# Patient Record
Sex: MALE | Race: BLACK OR AFRICAN AMERICAN | Employment: UNEMPLOYED | URBAN - METROPOLITAN AREA
[De-identification: names, ages, dates, MRNs, and addresses within clinical notes are randomized per-mention and may not be internally consistent; named-entity substitution may affect disease eponyms.]

---

## 2018-01-01 ENCOUNTER — HOSPITAL ENCOUNTER (OUTPATIENT)
Age: 68
Discharge: HOME | End: 2018-06-15
Payer: MEDICARE

## 2018-01-01 ENCOUNTER — HOSPITAL ENCOUNTER (OUTPATIENT)
Age: 68
Discharge: HOME | End: 2018-09-06
Payer: MEDICARE

## 2018-01-01 DIAGNOSIS — E78.5 HYPERLIPIDEMIA, UNSPECIFIED HYPERLIPIDEMIA TYPE: ICD-10-CM

## 2018-01-01 LAB
ANION GAP SERPL CALCULATED.3IONS-SCNC: 12 MMOL/L (ref 7–16)
BUN BLDV-MCNC: 15 MG/DL (ref 8–23)
CALCIUM SERPL-MCNC: 9.1 MG/DL (ref 8.6–10.2)
CHLORIDE BLD-SCNC: 103 MMOL/L (ref 98–107)
CHOLESTEROL, TOTAL: 141 MG/DL (ref 0–199)
CO2: 28 MMOL/L (ref 22–29)
CREAT SERPL-MCNC: 1.2 MG/DL (ref 0.7–1.2)
GFR AFRICAN AMERICAN: >60
GFR NON-AFRICAN AMERICAN: >60 ML/MIN/1.73
GLUCOSE BLD-MCNC: 94 MG/DL (ref 74–109)
HCT VFR BLD CALC: 43 % (ref 37–54)
HDLC SERPL-MCNC: 65 MG/DL
HEMOGLOBIN: 13.8 G/DL (ref 12.5–16.5)
LDL CHOLESTEROL CALCULATED: 62 MG/DL (ref 0–99)
MCH RBC QN AUTO: 28.9 PG (ref 26–35)
MCHC RBC AUTO-ENTMCNC: 32.1 % (ref 32–34.5)
MCV RBC AUTO: 90.1 FL (ref 80–99.9)
PDW BLD-RTO: 13.7 FL (ref 11.5–15)
PLATELET # BLD: 205 E9/L (ref 130–450)
PMV BLD AUTO: 10.2 FL (ref 7–12)
POTASSIUM SERPL-SCNC: 4.1 MMOL/L (ref 3.5–5)
PROSTATE SPECIFIC ANTIGEN: 0.58 NG/ML (ref 0–4)
RBC # BLD: 4.77 E12/L (ref 3.8–5.8)
SODIUM BLD-SCNC: 143 MMOL/L (ref 132–146)
TRIGL SERPL-MCNC: 72 MG/DL (ref 0–149)
VLDLC SERPL CALC-MCNC: 14 MG/DL
WBC # BLD: 3.3 E9/L (ref 4.5–11.5)

## 2018-01-01 PROCEDURE — 36415 COLL VENOUS BLD VENIPUNCTURE: CPT

## 2018-01-01 PROCEDURE — G0103 PSA SCREENING: HCPCS

## 2018-01-01 PROCEDURE — 85027 COMPLETE CBC AUTOMATED: CPT

## 2018-01-01 PROCEDURE — 80048 BASIC METABOLIC PNL TOTAL CA: CPT

## 2018-01-01 PROCEDURE — 80061 LIPID PANEL: CPT

## 2019-01-01 ENCOUNTER — HOSPITAL ENCOUNTER (INPATIENT)
Age: 69
LOS: 3 days | Discharge: OTHER INSTITUTION | DRG: 309 | End: 2019-04-24
Attending: EMERGENCY MEDICINE | Admitting: INTERNAL MEDICINE
Payer: MEDICARE

## 2019-01-01 ENCOUNTER — OFFICE VISIT (OUTPATIENT)
Dept: PAIN MANAGEMENT | Age: 69
End: 2019-01-01
Payer: MEDICARE

## 2019-01-01 ENCOUNTER — OFFICE VISIT (OUTPATIENT)
Dept: ONCOLOGY | Age: 69
End: 2019-01-01
Payer: MEDICARE

## 2019-01-01 ENCOUNTER — HOSPITAL ENCOUNTER (OUTPATIENT)
Age: 69
Discharge: HOME | End: 2019-04-04
Payer: MEDICARE

## 2019-01-01 ENCOUNTER — TELEPHONE (OUTPATIENT)
Dept: ONCOLOGY | Age: 69
End: 2019-01-01

## 2019-01-01 ENCOUNTER — HOSPITAL ENCOUNTER (OUTPATIENT)
Dept: INFUSION THERAPY | Age: 69
End: 2019-01-01

## 2019-01-01 ENCOUNTER — APPOINTMENT (OUTPATIENT)
Dept: CT IMAGING | Age: 69
DRG: 552 | End: 2019-01-01
Payer: MEDICARE

## 2019-01-01 ENCOUNTER — APPOINTMENT (OUTPATIENT)
Dept: NUCLEAR MEDICINE | Age: 69
DRG: 309 | End: 2019-01-01
Payer: MEDICARE

## 2019-01-01 ENCOUNTER — HOSPITAL ENCOUNTER (OUTPATIENT)
Dept: GENERAL RADIOLOGY | Age: 69
Discharge: HOME | End: 2019-05-09
Payer: MEDICARE

## 2019-01-01 ENCOUNTER — PREP FOR PROCEDURE (OUTPATIENT)
Dept: PAIN MANAGEMENT | Age: 69
End: 2019-01-01

## 2019-01-01 ENCOUNTER — HOSPITAL ENCOUNTER (INPATIENT)
Age: 69
LOS: 3 days | Discharge: HOME HEALTH | DRG: 552 | End: 2019-03-24
Attending: EMERGENCY MEDICINE | Admitting: FAMILY MEDICINE
Payer: MEDICARE

## 2019-01-01 ENCOUNTER — ANESTHESIA (OUTPATIENT)
Dept: CARDIAC CATH/INVASIVE PROCEDURES | Age: 69
End: 2019-01-01

## 2019-01-01 ENCOUNTER — TELEPHONE (OUTPATIENT)
Dept: CARDIOLOGY CLINIC | Age: 69
End: 2019-01-01

## 2019-01-01 ENCOUNTER — HOSPITAL ENCOUNTER (EMERGENCY)
Age: 69
Discharge: HOME | End: 2019-02-27
Attending: EMERGENCY MEDICINE
Payer: MEDICARE

## 2019-01-01 ENCOUNTER — HOSPITAL ENCOUNTER (OUTPATIENT)
Dept: INFUSION THERAPY | Age: 69
Discharge: HOME | End: 2019-04-01
Payer: MEDICARE

## 2019-01-01 ENCOUNTER — APPOINTMENT (OUTPATIENT)
Dept: GENERAL RADIOLOGY | Age: 69
DRG: 309 | End: 2019-01-01
Payer: MEDICARE

## 2019-01-01 ENCOUNTER — ANESTHESIA EVENT (OUTPATIENT)
Dept: CARDIAC CATH/INVASIVE PROCEDURES | Age: 69
End: 2019-01-01

## 2019-01-01 ENCOUNTER — APPOINTMENT (OUTPATIENT)
Dept: GENERAL RADIOLOGY | Age: 69
End: 2019-01-01
Payer: MEDICARE

## 2019-01-01 ENCOUNTER — HOSPITAL ENCOUNTER (OUTPATIENT)
Age: 69
Discharge: HOME | End: 2019-06-10
Payer: MEDICARE

## 2019-01-01 ENCOUNTER — HOSPITAL ENCOUNTER (OUTPATIENT)
Dept: NON INVASIVE DIAGNOSTICS | Age: 69
Discharge: HOME | End: 2019-03-14
Payer: MEDICARE

## 2019-01-01 ENCOUNTER — HOSPITAL ENCOUNTER (OUTPATIENT)
Dept: CARDIAC CATH/INVASIVE PROCEDURES | Age: 69
Setting detail: OUTPATIENT SURGERY
Discharge: HOME | End: 2019-03-28
Payer: MEDICARE

## 2019-01-01 ENCOUNTER — APPOINTMENT (OUTPATIENT)
Dept: MRI IMAGING | Age: 69
DRG: 552 | End: 2019-01-01
Payer: MEDICARE

## 2019-01-01 ENCOUNTER — OFFICE VISIT (OUTPATIENT)
Dept: FAMILY MEDICINE CLINIC | Age: 69
End: 2019-01-01
Payer: MEDICARE

## 2019-01-01 VITALS
TEMPERATURE: 99.2 F | RESPIRATION RATE: 16 BRPM | HEIGHT: 69 IN | SYSTOLIC BLOOD PRESSURE: 160 MMHG | WEIGHT: 182 LBS | BODY MASS INDEX: 26.96 KG/M2 | HEART RATE: 107 BPM | OXYGEN SATURATION: 97 % | DIASTOLIC BLOOD PRESSURE: 62 MMHG

## 2019-01-01 VITALS
TEMPERATURE: 96.4 F | HEART RATE: 115 BPM | DIASTOLIC BLOOD PRESSURE: 65 MMHG | SYSTOLIC BLOOD PRESSURE: 148 MMHG | HEIGHT: 68 IN | RESPIRATION RATE: 18 BRPM | BODY MASS INDEX: 24.48 KG/M2

## 2019-01-01 VITALS
HEART RATE: 84 BPM | DIASTOLIC BLOOD PRESSURE: 61 MMHG | OXYGEN SATURATION: 97 % | TEMPERATURE: 98 F | SYSTOLIC BLOOD PRESSURE: 154 MMHG | RESPIRATION RATE: 14 BRPM

## 2019-01-01 VITALS
SYSTOLIC BLOOD PRESSURE: 145 MMHG | WEIGHT: 180 LBS | DIASTOLIC BLOOD PRESSURE: 63 MMHG | BODY MASS INDEX: 27.28 KG/M2 | HEIGHT: 68 IN | HEART RATE: 97 BPM | RESPIRATION RATE: 24 BRPM | TEMPERATURE: 98.5 F

## 2019-01-01 VITALS
OXYGEN SATURATION: 93 % | RESPIRATION RATE: 18 BRPM | HEART RATE: 86 BPM | HEIGHT: 68 IN | SYSTOLIC BLOOD PRESSURE: 155 MMHG | BODY MASS INDEX: 24.4 KG/M2 | TEMPERATURE: 97.9 F | WEIGHT: 161 LBS | DIASTOLIC BLOOD PRESSURE: 70 MMHG

## 2019-01-01 VITALS
TEMPERATURE: 98.7 F | DIASTOLIC BLOOD PRESSURE: 62 MMHG | HEART RATE: 101 BPM | RESPIRATION RATE: 20 BRPM | OXYGEN SATURATION: 97 % | SYSTOLIC BLOOD PRESSURE: 172 MMHG

## 2019-01-01 VITALS
HEART RATE: 77 BPM | SYSTOLIC BLOOD PRESSURE: 140 MMHG | RESPIRATION RATE: 16 BRPM | HEIGHT: 69 IN | DIASTOLIC BLOOD PRESSURE: 58 MMHG | WEIGHT: 153.2 LBS | OXYGEN SATURATION: 96 % | BODY MASS INDEX: 22.69 KG/M2 | TEMPERATURE: 98.4 F

## 2019-01-01 VITALS
SYSTOLIC BLOOD PRESSURE: 117 MMHG | OXYGEN SATURATION: 100 % | RESPIRATION RATE: 17 BRPM | DIASTOLIC BLOOD PRESSURE: 45 MMHG

## 2019-01-01 VITALS
RESPIRATION RATE: 18 BRPM | DIASTOLIC BLOOD PRESSURE: 72 MMHG | OXYGEN SATURATION: 93 % | TEMPERATURE: 98 F | HEART RATE: 84 BPM | SYSTOLIC BLOOD PRESSURE: 138 MMHG | HEIGHT: 68 IN | BODY MASS INDEX: 24.4 KG/M2 | WEIGHT: 161 LBS

## 2019-01-01 DIAGNOSIS — I35.1 NONRHEUMATIC AORTIC VALVE INSUFFICIENCY: ICD-10-CM

## 2019-01-01 DIAGNOSIS — M54.9 INTRACTABLE BACK PAIN: ICD-10-CM

## 2019-01-01 DIAGNOSIS — E87.1 HYPONATREMIA: ICD-10-CM

## 2019-01-01 DIAGNOSIS — I50.9 CONGESTIVE HEART FAILURE, UNSPECIFIED HF CHRONICITY, UNSPECIFIED HEART FAILURE TYPE (HCC): ICD-10-CM

## 2019-01-01 DIAGNOSIS — D64.9 ANEMIA, UNSPECIFIED TYPE: Primary | ICD-10-CM

## 2019-01-01 DIAGNOSIS — M54.50 CHRONIC BILATERAL LOW BACK PAIN WITHOUT SCIATICA: ICD-10-CM

## 2019-01-01 DIAGNOSIS — M53.3 DISORDER OF SACRUM: ICD-10-CM

## 2019-01-01 DIAGNOSIS — M53.3 DISORDER OF SACRUM: Primary | ICD-10-CM

## 2019-01-01 DIAGNOSIS — R77.8 ELEVATED TROPONIN: ICD-10-CM

## 2019-01-01 DIAGNOSIS — Z53.1 REFUSAL OF BLOOD TRANSFUSIONS AS PATIENT IS JEHOVAH'S WITNESS: ICD-10-CM

## 2019-01-01 DIAGNOSIS — Z01.810 PREOPERATIVE CARDIOVASCULAR EXAMINATION: Primary | ICD-10-CM

## 2019-01-01 DIAGNOSIS — G89.29 CHRONIC BILATERAL LOW BACK PAIN WITHOUT SCIATICA: ICD-10-CM

## 2019-01-01 DIAGNOSIS — R00.0 TACHYCARDIA: Primary | ICD-10-CM

## 2019-01-01 DIAGNOSIS — R06.02 SHORTNESS OF BREATH: Primary | ICD-10-CM

## 2019-01-01 DIAGNOSIS — M54.50 LUMBAR BACK PAIN: Primary | ICD-10-CM

## 2019-01-01 DIAGNOSIS — D64.9 NORMOCYTIC ANEMIA: ICD-10-CM

## 2019-01-01 DIAGNOSIS — R06.02 SOB (SHORTNESS OF BREATH): Primary | ICD-10-CM

## 2019-01-01 DIAGNOSIS — R52 ACUTE PAIN: Primary | ICD-10-CM

## 2019-01-01 DIAGNOSIS — I35.1 AORTIC VALVE INSUFFICIENCY, ETIOLOGY OF CARDIAC VALVE DISEASE UNSPECIFIED: ICD-10-CM

## 2019-01-01 DIAGNOSIS — D64.9 NORMOCYTIC ANEMIA: Primary | ICD-10-CM

## 2019-01-01 DIAGNOSIS — M51.26 LUMBAR HERNIATED DISC: ICD-10-CM

## 2019-01-01 DIAGNOSIS — R52 PAIN: ICD-10-CM

## 2019-01-01 DIAGNOSIS — D64.9 CHRONIC ANEMIA: ICD-10-CM

## 2019-01-01 LAB
ABO/RH: NORMAL
ALBUMIN SERPL-MCNC: 2.6 G/DL (ref 3.5–4.7)
ALBUMIN SERPL-MCNC: 2.8 G/DL (ref 3.5–5.2)
ALBUMIN SERPL-MCNC: 3.1 G/DL (ref 3.5–5.2)
ALBUMIN SERPL-MCNC: 3.2 G/DL (ref 3.5–5.2)
ALP BLD-CCNC: 118 U/L (ref 40–129)
ALP BLD-CCNC: 132 U/L (ref 40–129)
ALP BLD-CCNC: 85 U/L (ref 40–129)
ALPHA-1-GLOBULIN: 0.5 G/DL (ref 0.2–0.4)
ALPHA-2-GLOBULIN: 0.9 G/FL (ref 0.5–1)
ALT SERPL-CCNC: 40 U/L (ref 0–40)
ALT SERPL-CCNC: 75 U/L (ref 0–40)
ALT SERPL-CCNC: 78 U/L (ref 0–40)
ANION GAP SERPL CALCULATED.3IONS-SCNC: 10 MMOL/L (ref 7–16)
ANION GAP SERPL CALCULATED.3IONS-SCNC: 10 MMOL/L (ref 7–16)
ANION GAP SERPL CALCULATED.3IONS-SCNC: 12 MMOL/L (ref 7–16)
ANION GAP SERPL CALCULATED.3IONS-SCNC: 14 MMOL/L (ref 7–16)
ANION GAP SERPL CALCULATED.3IONS-SCNC: 15 MMOL/L (ref 7–16)
ANTIBODY SCREEN: NORMAL
AST SERPL-CCNC: 32 U/L (ref 0–39)
AST SERPL-CCNC: 40 U/L (ref 0–39)
AST SERPL-CCNC: 45 U/L (ref 0–39)
BACTERIA: ABNORMAL /HPF
BASOPHILS ABSOLUTE: 0 E9/L (ref 0–0.2)
BASOPHILS ABSOLUTE: 0 E9/L (ref 0–0.2)
BASOPHILS ABSOLUTE: 0.01 E9/L (ref 0–0.2)
BASOPHILS ABSOLUTE: 0.02 E9/L (ref 0–0.2)
BASOPHILS ABSOLUTE: 0.02 E9/L (ref 0–0.2)
BASOPHILS ABSOLUTE: 0.03 E9/L (ref 0–0.2)
BASOPHILS ABSOLUTE: 0.03 E9/L (ref 0–0.2)
BASOPHILS ABSOLUTE: 0.04 E9/L (ref 0–0.2)
BASOPHILS RELATIVE PERCENT: 0.1 % (ref 0–2)
BASOPHILS RELATIVE PERCENT: 0.2 % (ref 0–2)
BASOPHILS RELATIVE PERCENT: 0.3 % (ref 0–2)
BASOPHILS RELATIVE PERCENT: 0.9 % (ref 0–2)
BETA GLOBULIN: 1.1 G/DL (ref 0.8–1.3)
BETA-2 MICROGLOBULIN: 2.3 MG/L (ref 0.6–2.4)
BILIRUB SERPL-MCNC: 0.7 MG/DL (ref 0–1.2)
BILIRUB SERPL-MCNC: 0.7 MG/DL (ref 0–1.2)
BILIRUB SERPL-MCNC: 0.8 MG/DL (ref 0–1.2)
BILIRUBIN URINE: NEGATIVE
BILIRUBIN URINE: NEGATIVE
BLOOD CULTURE, ROUTINE: NORMAL
BLOOD, URINE: NEGATIVE
BLOOD, URINE: NEGATIVE
BUN BLDV-MCNC: 13 MG/DL (ref 8–23)
BUN BLDV-MCNC: 14 MG/DL (ref 8–23)
BUN BLDV-MCNC: 14 MG/DL (ref 8–23)
BUN BLDV-MCNC: 15 MG/DL (ref 8–23)
BUN BLDV-MCNC: 15 MG/DL (ref 8–23)
BUN BLDV-MCNC: 19 MG/DL (ref 8–23)
BUN BLDV-MCNC: 24 MG/DL (ref 8–23)
BURR CELLS: ABNORMAL
BURR CELLS: ABNORMAL
CALCIUM SERPL-MCNC: 8.4 MG/DL (ref 8.6–10.2)
CALCIUM SERPL-MCNC: 8.5 MG/DL (ref 8.6–10.2)
CALCIUM SERPL-MCNC: 8.7 MG/DL (ref 8.6–10.2)
CALCIUM SERPL-MCNC: 8.7 MG/DL (ref 8.6–10.2)
CALCIUM SERPL-MCNC: 8.8 MG/DL (ref 8.6–10.2)
CALCIUM SERPL-MCNC: 8.9 MG/DL (ref 8.6–10.2)
CALCIUM SERPL-MCNC: 9.1 MG/DL (ref 8.6–10.2)
CHLORIDE BLD-SCNC: 100 MMOL/L (ref 98–107)
CHLORIDE BLD-SCNC: 101 MMOL/L (ref 98–107)
CHLORIDE BLD-SCNC: 95 MMOL/L (ref 98–107)
CHLORIDE BLD-SCNC: 95 MMOL/L (ref 98–107)
CHLORIDE BLD-SCNC: 96 MMOL/L (ref 98–107)
CHLORIDE BLD-SCNC: 97 MMOL/L (ref 98–107)
CHLORIDE BLD-SCNC: 98 MMOL/L (ref 98–107)
CLARITY: CLEAR
CLARITY: CLEAR
CO2: 22 MMOL/L (ref 22–29)
CO2: 23 MMOL/L (ref 22–29)
CO2: 25 MMOL/L (ref 22–29)
CO2: 25 MMOL/L (ref 22–29)
CO2: 26 MMOL/L (ref 22–29)
CO2: 26 MMOL/L (ref 22–29)
CO2: 27 MMOL/L (ref 22–29)
COLOR: YELLOW
COLOR: YELLOW
COPPER: 209 UG/DL (ref 70–140)
CREAT SERPL-MCNC: 0.7 MG/DL (ref 0.7–1.2)
CREAT SERPL-MCNC: 0.8 MG/DL (ref 0.7–1.2)
CREAT SERPL-MCNC: 0.8 MG/DL (ref 0.7–1.2)
CREAT SERPL-MCNC: 0.9 MG/DL (ref 0.7–1.2)
CREAT SERPL-MCNC: 1.1 MG/DL (ref 0.7–1.2)
CRYSTALS, UA: ABNORMAL
CULTURE, BLOOD 2: ABNORMAL
CULTURE, BLOOD 2: ABNORMAL
DAT POLYSPECIFIC: NORMAL
EKG ATRIAL RATE: 123 BPM
EKG ATRIAL RATE: 93 BPM
EKG P AXIS: 89 DEGREES
EKG P-R INTERVAL: 144 MS
EKG P-R INTERVAL: 160 MS
EKG Q-T INTERVAL: 336 MS
EKG Q-T INTERVAL: 402 MS
EKG QRS DURATION: 86 MS
EKG QRS DURATION: 98 MS
EKG QTC CALCULATION (BAZETT): 481 MS
EKG QTC CALCULATION (BAZETT): 499 MS
EKG R AXIS: 138 DEGREES
EKG R AXIS: 83 DEGREES
EKG T AXIS: 166 DEGREES
EKG T AXIS: 33 DEGREES
EKG VENTRICULAR RATE: 123 BPM
EKG VENTRICULAR RATE: 93 BPM
ELECTROPHORESIS: ABNORMAL
EOSINOPHILS ABSOLUTE: 0 E9/L (ref 0.05–0.5)
EOSINOPHILS ABSOLUTE: 0.01 E9/L (ref 0.05–0.5)
EOSINOPHILS ABSOLUTE: 0.02 E9/L (ref 0.05–0.5)
EOSINOPHILS ABSOLUTE: 0.24 E9/L (ref 0.05–0.5)
EOSINOPHILS RELATIVE PERCENT: 0 % (ref 0–6)
EOSINOPHILS RELATIVE PERCENT: 0.1 % (ref 0–6)
EOSINOPHILS RELATIVE PERCENT: 0.2 % (ref 0–6)
EOSINOPHILS RELATIVE PERCENT: 5.4 % (ref 0–6)
FERRITIN: 842 NG/ML
FOLATE: 15 NG/ML (ref 4.8–24.2)
GAMMA GLOBULIN: 1.7 G/DL (ref 0.7–1.6)
GFR AFRICAN AMERICAN: >60
GFR NON-AFRICAN AMERICAN: >60 ML/MIN/1.73
GLUCOSE BLD-MCNC: 116 MG/DL (ref 74–99)
GLUCOSE BLD-MCNC: 136 MG/DL (ref 74–99)
GLUCOSE BLD-MCNC: 158 MG/DL (ref 74–99)
GLUCOSE BLD-MCNC: 161 MG/DL (ref 74–99)
GLUCOSE BLD-MCNC: 175 MG/DL (ref 74–99)
GLUCOSE BLD-MCNC: 176 MG/DL (ref 74–99)
GLUCOSE BLD-MCNC: 179 MG/DL (ref 74–99)
GLUCOSE URINE: NEGATIVE MG/DL
GLUCOSE URINE: NEGATIVE MG/DL
HAPTOGLOBIN: 278 MG/DL (ref 30–200)
HBA1C MFR BLD: 6.6 % (ref 4–5.6)
HCT VFR BLD CALC: 25 % (ref 37–54)
HCT VFR BLD CALC: 25.6 % (ref 37–54)
HCT VFR BLD CALC: 26 % (ref 37–54)
HCT VFR BLD CALC: 28.5 % (ref 37–54)
HCT VFR BLD CALC: 30.9 % (ref 37–54)
HCT VFR BLD CALC: 31 % (ref 37–54)
HCT VFR BLD CALC: 31.3 % (ref 37–54)
HCT VFR BLD CALC: 31.7 % (ref 37–54)
HCT VFR BLD CALC: 32 % (ref 37–54)
HCT VFR BLD CALC: 32.5 % (ref 37–54)
HCT VFR BLD CALC: 37.5 % (ref 37–54)
HEMOGLOBIN: 10 G/DL (ref 12.5–16.5)
HEMOGLOBIN: 10 G/DL (ref 12.5–16.5)
HEMOGLOBIN: 10.1 G/DL (ref 12.5–16.5)
HEMOGLOBIN: 10.2 G/DL (ref 12.5–16.5)
HEMOGLOBIN: 10.6 G/DL (ref 12.5–16.5)
HEMOGLOBIN: 10.6 G/DL (ref 12.5–16.5)
HEMOGLOBIN: 11.3 G/DL (ref 12.5–16.5)
HEMOGLOBIN: 7.9 G/DL (ref 12.5–16.5)
HEMOGLOBIN: 8.2 G/DL (ref 12.5–16.5)
HEMOGLOBIN: 9 G/DL (ref 12.5–16.5)
IGA: 280 MG/DL (ref 70–400)
IGG: 1273 MG/DL (ref 700–1600)
IGM: 248 MG/DL (ref 40–230)
IMMATURE GRANULOCYTES #: 0.01 E9/L
IMMATURE GRANULOCYTES #: 0.04 E9/L
IMMATURE GRANULOCYTES #: 0.07 E9/L
IMMATURE GRANULOCYTES #: 0.11 E9/L
IMMATURE GRANULOCYTES #: 0.12 E9/L
IMMATURE GRANULOCYTES #: 0.13 E9/L
IMMATURE GRANULOCYTES %: 0.2 % (ref 0–5)
IMMATURE GRANULOCYTES %: 0.4 % (ref 0–5)
IMMATURE GRANULOCYTES %: 0.6 % (ref 0–5)
IMMATURE GRANULOCYTES %: 0.6 % (ref 0–5)
IMMATURE GRANULOCYTES %: 0.8 % (ref 0–5)
IMMATURE GRANULOCYTES %: 0.9 % (ref 0–5)
IMMATURE RETIC FRACT: 14.5 % (ref 2.3–13.4)
IMMATURE RETIC FRACT: 27 % (ref 2.3–13.4)
IMMUNOFIXATION RESULT, SERUM: NORMAL
INFLUENZA A BY PCR: NOT DETECTED
INFLUENZA B BY PCR: NOT DETECTED
INR BLD: 1.4
IRON SATURATION: 8 % (ref 20–55)
IRON: 14 MCG/DL (ref 59–158)
JAK2 GENE MUTATION QUAL: NOT DETECTED
KAPPA FREE LIGHT CHAINS QNT: 2.91 MG/DL (ref 0.33–1.94)
KAPPA/LAMBDA FREE LIGHT CHAIN RATIO: 1.05 (ref 0.26–1.65)
KETONES, URINE: ABNORMAL MG/DL
KETONES, URINE: NEGATIVE MG/DL
LACTATE DEHYDROGENASE: 295 U/L (ref 135–225)
LACTIC ACID, SEPSIS: 0.9 MMOL/L (ref 0.5–1.9)
LACTIC ACID, SEPSIS: 1.3 MMOL/L (ref 0.5–1.9)
LACTIC ACID, SEPSIS: 2.1 MMOL/L (ref 0.5–1.9)
LAMBDA FREE LIGHT CHAINS QNT: 2.76 MG/DL (ref 0.57–2.63)
LEUKOCYTE ESTERASE, URINE: ABNORMAL
LEUKOCYTE ESTERASE, URINE: NEGATIVE
LV EF: 60 %
LV EF: 70 %
LVEF MODALITY: NORMAL
LVEF MODALITY: NORMAL
LYMPHOCYTES ABSOLUTE: 0.43 E9/L (ref 1.5–4)
LYMPHOCYTES ABSOLUTE: 0.6 E9/L (ref 1.5–4)
LYMPHOCYTES ABSOLUTE: 0.69 E9/L (ref 1.5–4)
LYMPHOCYTES ABSOLUTE: 0.84 E9/L (ref 1.5–4)
LYMPHOCYTES ABSOLUTE: 0.95 E9/L (ref 1.5–4)
LYMPHOCYTES ABSOLUTE: 0.95 E9/L (ref 1.5–4)
LYMPHOCYTES ABSOLUTE: 0.97 E9/L (ref 1.5–4)
LYMPHOCYTES ABSOLUTE: 1.41 E9/L (ref 1.5–4)
LYMPHOCYTES RELATIVE PERCENT: 12.5 % (ref 20–42)
LYMPHOCYTES RELATIVE PERCENT: 2.6 % (ref 20–42)
LYMPHOCYTES RELATIVE PERCENT: 21.7 % (ref 20–42)
LYMPHOCYTES RELATIVE PERCENT: 3.5 % (ref 20–42)
LYMPHOCYTES RELATIVE PERCENT: 4.7 % (ref 20–42)
LYMPHOCYTES RELATIVE PERCENT: 5 % (ref 20–42)
LYMPHOCYTES RELATIVE PERCENT: 6 % (ref 20–42)
LYMPHOCYTES RELATIVE PERCENT: 7.8 % (ref 20–42)
Lab: NORMAL
MAGNESIUM: 2 MG/DL (ref 1.6–2.6)
MCH RBC QN AUTO: 26.3 PG (ref 26–35)
MCH RBC QN AUTO: 26.5 PG (ref 26–35)
MCH RBC QN AUTO: 26.5 PG (ref 26–35)
MCH RBC QN AUTO: 27.6 PG (ref 26–35)
MCH RBC QN AUTO: 27.7 PG (ref 26–35)
MCH RBC QN AUTO: 27.8 PG (ref 26–35)
MCH RBC QN AUTO: 29 PG (ref 26–35)
MCHC RBC AUTO-ENTMCNC: 30.1 % (ref 32–34.5)
MCHC RBC AUTO-ENTMCNC: 31.5 % (ref 32–34.5)
MCHC RBC AUTO-ENTMCNC: 31.6 % (ref 32–34.5)
MCHC RBC AUTO-ENTMCNC: 32.2 % (ref 32–34.5)
MCHC RBC AUTO-ENTMCNC: 32.3 % (ref 32–34.5)
MCHC RBC AUTO-ENTMCNC: 32.3 % (ref 32–34.5)
MCHC RBC AUTO-ENTMCNC: 32.4 % (ref 32–34.5)
MCHC RBC AUTO-ENTMCNC: 32.6 % (ref 32–34.5)
MCHC RBC AUTO-ENTMCNC: 33.1 % (ref 32–34.5)
MCV RBC AUTO: 83.9 FL (ref 80–99.9)
MCV RBC AUTO: 84.1 FL (ref 80–99.9)
MCV RBC AUTO: 85.1 FL (ref 80–99.9)
MCV RBC AUTO: 85.7 FL (ref 80–99.9)
MCV RBC AUTO: 85.8 FL (ref 80–99.9)
MCV RBC AUTO: 85.8 FL (ref 80–99.9)
MCV RBC AUTO: 85.9 FL (ref 80–99.9)
MCV RBC AUTO: 87.4 FL (ref 80–99.9)
MCV RBC AUTO: 87.4 FL (ref 80–99.9)
METAMYELOCYTES RELATIVE PERCENT: 0.9 % (ref 0–1)
MONOCYTES ABSOLUTE: 0.14 E9/L (ref 0.1–0.95)
MONOCYTES ABSOLUTE: 0.65 E9/L (ref 0.1–0.95)
MONOCYTES ABSOLUTE: 0.71 E9/L (ref 0.1–0.95)
MONOCYTES ABSOLUTE: 0.76 E9/L (ref 0.1–0.95)
MONOCYTES ABSOLUTE: 0.89 E9/L (ref 0.1–0.95)
MONOCYTES ABSOLUTE: 0.98 E9/L (ref 0.1–0.95)
MONOCYTES ABSOLUTE: 1.04 E9/L (ref 0.1–0.95)
MONOCYTES ABSOLUTE: 1.26 E9/L (ref 0.1–0.95)
MONOCYTES RELATIVE PERCENT: 0.9 % (ref 2–12)
MONOCYTES RELATIVE PERCENT: 11.1 % (ref 2–12)
MONOCYTES RELATIVE PERCENT: 14.5 % (ref 2–12)
MONOCYTES RELATIVE PERCENT: 4.7 % (ref 2–12)
MONOCYTES RELATIVE PERCENT: 4.9 % (ref 2–12)
MONOCYTES RELATIVE PERCENT: 5.2 % (ref 2–12)
MONOCYTES RELATIVE PERCENT: 7 % (ref 2–12)
MONOCYTES RELATIVE PERCENT: 8.5 % (ref 2–12)
NEUTROPHILS ABSOLUTE: 10.13 E9/L (ref 1.8–7.3)
NEUTROPHILS ABSOLUTE: 12.08 E9/L (ref 1.8–7.3)
NEUTROPHILS ABSOLUTE: 13.04 E9/L (ref 1.8–7.3)
NEUTROPHILS ABSOLUTE: 13.74 E9/L (ref 1.8–7.3)
NEUTROPHILS ABSOLUTE: 13.77 E9/L (ref 1.8–7.3)
NEUTROPHILS ABSOLUTE: 17.1 E9/L (ref 1.8–7.3)
NEUTROPHILS ABSOLUTE: 2.57 E9/L (ref 1.8–7.3)
NEUTROPHILS ABSOLUTE: 8.55 E9/L (ref 1.8–7.3)
NEUTROPHILS RELATIVE PERCENT: 57.3 % (ref 43–80)
NEUTROPHILS RELATIVE PERCENT: 75.5 % (ref 43–80)
NEUTROPHILS RELATIVE PERCENT: 82.9 % (ref 43–80)
NEUTROPHILS RELATIVE PERCENT: 85.9 % (ref 43–80)
NEUTROPHILS RELATIVE PERCENT: 89.4 % (ref 43–80)
NEUTROPHILS RELATIVE PERCENT: 89.5 % (ref 43–80)
NEUTROPHILS RELATIVE PERCENT: 91.3 % (ref 43–80)
NEUTROPHILS RELATIVE PERCENT: 95.7 % (ref 43–80)
NITRITE, URINE: NEGATIVE
NITRITE, URINE: NEGATIVE
ORGANISM: ABNORMAL
ORGANISM: ABNORMAL
OVALOCYTES: ABNORMAL
OVALOCYTES: ABNORMAL
PATHOLOGIST REVIEW: NORMAL
PDW BLD-RTO: 13.9 FL (ref 11.5–15)
PDW BLD-RTO: 14.3 FL (ref 11.5–15)
PDW BLD-RTO: 14.3 FL (ref 11.5–15)
PDW BLD-RTO: 14.4 FL (ref 11.5–15)
PDW BLD-RTO: 14.6 FL (ref 11.5–15)
PDW BLD-RTO: 15.3 FL (ref 11.5–15)
PDW BLD-RTO: 16 FL (ref 11.5–15)
PDW BLD-RTO: 16 FL (ref 11.5–15)
PDW BLD-RTO: 17.4 FL (ref 11.5–15)
PH UA: 5.5 (ref 5–9)
PH UA: 6 (ref 5–9)
PLATELET # BLD: 266 E9/L (ref 130–450)
PLATELET # BLD: 285 E9/L (ref 130–450)
PLATELET # BLD: 308 E9/L (ref 130–450)
PLATELET # BLD: 342 E9/L (ref 130–450)
PLATELET # BLD: 349 E9/L (ref 130–450)
PLATELET # BLD: 356 E9/L (ref 130–450)
PLATELET # BLD: 357 E9/L (ref 130–450)
PLATELET # BLD: 360 E9/L (ref 130–450)
PLATELET # BLD: 360 E9/L (ref 130–450)
PMV BLD AUTO: 10.7 FL (ref 7–12)
PMV BLD AUTO: 9.1 FL (ref 7–12)
PMV BLD AUTO: 9.5 FL (ref 7–12)
PMV BLD AUTO: 9.6 FL (ref 7–12)
PMV BLD AUTO: 9.6 FL (ref 7–12)
PMV BLD AUTO: 9.7 FL (ref 7–12)
PMV BLD AUTO: 9.8 FL (ref 7–12)
POIKILOCYTES: ABNORMAL
POIKILOCYTES: ABNORMAL
POLYCHROMASIA: ABNORMAL
POTASSIUM REFLEX MAGNESIUM: 4.1 MMOL/L (ref 3.5–5)
POTASSIUM SERPL-SCNC: 3.9 MMOL/L (ref 3.5–5)
POTASSIUM SERPL-SCNC: 4 MMOL/L (ref 3.5–5)
POTASSIUM SERPL-SCNC: 4 MMOL/L (ref 3.5–5)
POTASSIUM SERPL-SCNC: 4.2 MMOL/L (ref 3.5–5)
POTASSIUM SERPL-SCNC: 4.3 MMOL/L (ref 3.5–5)
POTASSIUM SERPL-SCNC: 4.8 MMOL/L (ref 3.5–5)
PRO-BNP: 1030 PG/ML (ref 0–125)
PRO-BNP: 226 PG/ML (ref 0–125)
PROCALCITONIN: 0.77 NG/ML (ref 0–0.08)
PROTEIN UA: NEGATIVE MG/DL
PROTEIN UA: NEGATIVE MG/DL
PROTHROMBIN TIME: 16.3 SEC (ref 9.3–12.4)
RBC # BLD: 3.1 E12/L (ref 3.8–5.8)
RBC # BLD: 3.39 E12/L (ref 3.8–5.8)
RBC # BLD: 3.6 E12/L (ref 3.8–5.8)
RBC # BLD: 3.61 E12/L (ref 3.8–5.8)
RBC # BLD: 3.65 E12/L (ref 3.8–5.8)
RBC # BLD: 3.66 E12/L (ref 3.8–5.8)
RBC # BLD: 3.7 E12/L (ref 3.8–5.8)
RBC # BLD: 3.82 E12/L (ref 3.8–5.8)
RBC # BLD: 4.29 E12/L (ref 3.8–5.8)
RBC UA: ABNORMAL /HPF (ref 0–2)
REPORT: NORMAL
RETIC HGB EQUIVALENT: 29.3 PG (ref 28.2–36.6)
RETIC HGB EQUIVALENT: 32.4 PG (ref 28.2–36.6)
RETICULOCYTE ABSOLUTE COUNT: 0.05 E12/L
RETICULOCYTE ABSOLUTE COUNT: 0.08 E12/L
RETICULOCYTE COUNT PCT: 1.4 % (ref 0.4–1.9)
RETICULOCYTE COUNT PCT: 2.7 % (ref 0.4–1.9)
SODIUM BLD-SCNC: 131 MMOL/L (ref 132–146)
SODIUM BLD-SCNC: 131 MMOL/L (ref 132–146)
SODIUM BLD-SCNC: 133 MMOL/L (ref 132–146)
SODIUM BLD-SCNC: 135 MMOL/L (ref 132–146)
SODIUM BLD-SCNC: 135 MMOL/L (ref 132–146)
SODIUM BLD-SCNC: 137 MMOL/L (ref 132–146)
SODIUM BLD-SCNC: 139 MMOL/L (ref 132–146)
SPECIFIC GRAVITY UA: 1.01 (ref 1–1.03)
SPECIFIC GRAVITY UA: 1.01 (ref 1–1.03)
THIS TEST SENT TO: NORMAL
TOTAL IRON BINDING CAPACITY: 180 MCG/DL (ref 250–450)
TOTAL PROTEIN: 6.6 G/DL (ref 6.4–8.3)
TOTAL PROTEIN: 6.8 G/DL (ref 6.4–8.3)
TOTAL PROTEIN: 7.3 G/DL (ref 6.4–8.3)
TRANSFERRIN: 111 MG/DL (ref 200–360)
TROPONIN: 0.06 NG/ML (ref 0–0.03)
TROPONIN: 0.11 NG/ML (ref 0–0.03)
TROPONIN: 0.13 NG/ML (ref 0–0.03)
TROPONIN: <0.01 NG/ML (ref 0–0.03)
TSH SERPL DL<=0.05 MIU/L-ACNC: 3.03 UIU/ML (ref 0.27–4.2)
UROBILINOGEN, URINE: 0.2 E.U./DL
UROBILINOGEN, URINE: 1 E.U./DL
VANCOMYCIN RANDOM: 15.4 MCG/ML (ref 5–40)
VITAMIN B-12: 786 PG/ML (ref 211–946)
VITAMIN B6: 22.3 NMOL/L (ref 20–125)
WBC # BLD: 11.3 E9/L (ref 4.5–11.5)
WBC # BLD: 11.6 E9/L (ref 4.5–11.5)
WBC # BLD: 12.2 E9/L (ref 4.5–11.5)
WBC # BLD: 14.1 E9/L (ref 4.5–11.5)
WBC # BLD: 14.2 E9/L (ref 4.5–11.5)
WBC # BLD: 14.6 E9/L (ref 4.5–11.5)
WBC # BLD: 15.1 E9/L (ref 4.5–11.5)
WBC # BLD: 19.1 E9/L (ref 4.5–11.5)
WBC # BLD: 4.5 E9/L (ref 4.5–11.5)
WBC UA: ABNORMAL /HPF (ref 0–5)
ZINC: 68 UG/DL (ref 60–120)

## 2019-01-01 PROCEDURE — 6370000000 HC RX 637 (ALT 250 FOR IP): Performed by: FAMILY MEDICINE

## 2019-01-01 PROCEDURE — 83540 ASSAY OF IRON: CPT

## 2019-01-01 PROCEDURE — 85025 COMPLETE CBC W/AUTO DIFF WBC: CPT

## 2019-01-01 PROCEDURE — 6360000002 HC RX W HCPCS: Performed by: EMERGENCY MEDICINE

## 2019-01-01 PROCEDURE — A9560 TC99M LABELED RBC: HCPCS | Performed by: RADIOLOGY

## 2019-01-01 PROCEDURE — 85027 COMPLETE CBC AUTOMATED: CPT

## 2019-01-01 PROCEDURE — 96376 TX/PRO/DX INJ SAME DRUG ADON: CPT

## 2019-01-01 PROCEDURE — 84443 ASSAY THYROID STIM HORMONE: CPT

## 2019-01-01 PROCEDURE — 83615 LACTATE (LD) (LDH) ENZYME: CPT

## 2019-01-01 PROCEDURE — 88275 CYTOGENETICS 100-300: CPT

## 2019-01-01 PROCEDURE — 36415 COLL VENOUS BLD VENIPUNCTURE: CPT

## 2019-01-01 PROCEDURE — 82232 ASSAY OF BETA-2 PROTEIN: CPT

## 2019-01-01 PROCEDURE — 84466 ASSAY OF TRANSFERRIN: CPT

## 2019-01-01 PROCEDURE — 6370000000 HC RX 637 (ALT 250 FOR IP): Performed by: EMERGENCY MEDICINE

## 2019-01-01 PROCEDURE — 6360000002 HC RX W HCPCS: Performed by: INTERNAL MEDICINE

## 2019-01-01 PROCEDURE — 86880 COOMBS TEST DIRECT: CPT

## 2019-01-01 PROCEDURE — 2580000003 HC RX 258: Performed by: FAMILY MEDICINE

## 2019-01-01 PROCEDURE — 83735 ASSAY OF MAGNESIUM: CPT

## 2019-01-01 PROCEDURE — 6370000000 HC RX 637 (ALT 250 FOR IP): Performed by: NURSE PRACTITIONER

## 2019-01-01 PROCEDURE — 88271 CYTOGENETICS DNA PROBE: CPT

## 2019-01-01 PROCEDURE — 6370000000 HC RX 637 (ALT 250 FOR IP): Performed by: INTERNAL MEDICINE

## 2019-01-01 PROCEDURE — 86900 BLOOD TYPING SEROLOGIC ABO: CPT

## 2019-01-01 PROCEDURE — G0378 HOSPITAL OBSERVATION PER HR: HCPCS

## 2019-01-01 PROCEDURE — 6360000002 HC RX W HCPCS: Performed by: FAMILY MEDICINE

## 2019-01-01 PROCEDURE — 97162 PT EVAL MOD COMPLEX 30 MIN: CPT

## 2019-01-01 PROCEDURE — 84630 ASSAY OF ZINC: CPT

## 2019-01-01 PROCEDURE — 93312 ECHO TRANSESOPHAGEAL: CPT | Performed by: INTERNAL MEDICINE

## 2019-01-01 PROCEDURE — 96374 THER/PROPH/DIAG INJ IV PUSH: CPT

## 2019-01-01 PROCEDURE — 81270 JAK2 GENE: CPT

## 2019-01-01 PROCEDURE — 86850 RBC ANTIBODY SCREEN: CPT

## 2019-01-01 PROCEDURE — 93458 L HRT ARTERY/VENTRICLE ANGIO: CPT | Performed by: INTERNAL MEDICINE

## 2019-01-01 PROCEDURE — 83036 HEMOGLOBIN GLYCOSYLATED A1C: CPT

## 2019-01-01 PROCEDURE — 71046 X-RAY EXAM CHEST 2 VIEWS: CPT

## 2019-01-01 PROCEDURE — 93312 ECHO TRANSESOPHAGEAL: CPT

## 2019-01-01 PROCEDURE — 93306 TTE W/DOPPLER COMPLETE: CPT

## 2019-01-01 PROCEDURE — 97530 THERAPEUTIC ACTIVITIES: CPT

## 2019-01-01 PROCEDURE — 87040 BLOOD CULTURE FOR BACTERIA: CPT

## 2019-01-01 PROCEDURE — 83605 ASSAY OF LACTIC ACID: CPT

## 2019-01-01 PROCEDURE — 1200000000 HC SEMI PRIVATE

## 2019-01-01 PROCEDURE — 2140000000 HC CCU INTERMEDIATE R&B

## 2019-01-01 PROCEDURE — 2580000003 HC RX 258: Performed by: INTERNAL MEDICINE

## 2019-01-01 PROCEDURE — C1769 GUIDE WIRE: HCPCS

## 2019-01-01 PROCEDURE — 71045 X-RAY EXAM CHEST 1 VIEW: CPT

## 2019-01-01 PROCEDURE — 83550 IRON BINDING TEST: CPT

## 2019-01-01 PROCEDURE — 96372 THER/PROPH/DIAG INJ SC/IM: CPT

## 2019-01-01 PROCEDURE — 83880 ASSAY OF NATRIURETIC PEPTIDE: CPT

## 2019-01-01 PROCEDURE — 88185 FLOWCYTOMETRY/TC ADD-ON: CPT

## 2019-01-01 PROCEDURE — 99214 OFFICE O/P EST MOD 30 MIN: CPT | Performed by: THORACIC SURGERY (CARDIOTHORACIC VASCULAR SURGERY)

## 2019-01-01 PROCEDURE — 2500000003 HC RX 250 WO HCPCS

## 2019-01-01 PROCEDURE — 6360000002 HC RX W HCPCS

## 2019-01-01 PROCEDURE — 99152 MOD SED SAME PHYS/QHP 5/>YRS: CPT | Performed by: INTERNAL MEDICINE

## 2019-01-01 PROCEDURE — C1894 INTRO/SHEATH, NON-LASER: HCPCS

## 2019-01-01 PROCEDURE — 86901 BLOOD TYPING SEROLOGIC RH(D): CPT

## 2019-01-01 PROCEDURE — 87502 INFLUENZA DNA AMP PROBE: CPT

## 2019-01-01 PROCEDURE — 93320 DOPPLER ECHO COMPLETE: CPT | Performed by: INTERNAL MEDICINE

## 2019-01-01 PROCEDURE — 99285 EMERGENCY DEPT VISIT HI MDM: CPT

## 2019-01-01 PROCEDURE — C1887 CATHETER, GUIDING: HCPCS

## 2019-01-01 PROCEDURE — 93325 DOPPLER ECHO COLOR FLOW MAPG: CPT

## 2019-01-01 PROCEDURE — 84145 PROCALCITONIN (PCT): CPT

## 2019-01-01 PROCEDURE — 97535 SELF CARE MNGMENT TRAINING: CPT

## 2019-01-01 PROCEDURE — 81001 URINALYSIS AUTO W/SCOPE: CPT

## 2019-01-01 PROCEDURE — 80053 COMPREHEN METABOLIC PANEL: CPT

## 2019-01-01 PROCEDURE — 99204 OFFICE O/P NEW MOD 45 MIN: CPT | Performed by: INTERNAL MEDICINE

## 2019-01-01 PROCEDURE — 99213 OFFICE O/P EST LOW 20 MIN: CPT | Performed by: PHYSICIAN ASSISTANT

## 2019-01-01 PROCEDURE — 3430000000 HC RX DIAGNOSTIC RADIOPHARMACEUTICAL: Performed by: RADIOLOGY

## 2019-01-01 PROCEDURE — 80048 BASIC METABOLIC PNL TOTAL CA: CPT

## 2019-01-01 PROCEDURE — 93005 ELECTROCARDIOGRAM TRACING: CPT | Performed by: EMERGENCY MEDICINE

## 2019-01-01 PROCEDURE — 85045 AUTOMATED RETICULOCYTE COUNT: CPT

## 2019-01-01 PROCEDURE — 88184 FLOWCYTOMETRY/ TC 1 MARKER: CPT

## 2019-01-01 PROCEDURE — 87077 CULTURE AEROBIC IDENTIFY: CPT

## 2019-01-01 PROCEDURE — 83010 ASSAY OF HAPTOGLOBIN QUANT: CPT

## 2019-01-01 PROCEDURE — 84484 ASSAY OF TROPONIN QUANT: CPT

## 2019-01-01 PROCEDURE — 83020 HEMOGLOBIN ELECTROPHORESIS: CPT

## 2019-01-01 PROCEDURE — 72148 MRI LUMBAR SPINE W/O DYE: CPT

## 2019-01-01 PROCEDURE — 82525 ASSAY OF COPPER: CPT

## 2019-01-01 PROCEDURE — 2709999900 HC NON-CHARGEABLE SUPPLY

## 2019-01-01 PROCEDURE — 85018 HEMOGLOBIN: CPT

## 2019-01-01 PROCEDURE — 83021 HEMOGLOBIN CHROMOTOGRAPHY: CPT

## 2019-01-01 PROCEDURE — 85014 HEMATOCRIT: CPT

## 2019-01-01 PROCEDURE — 97166 OT EVAL MOD COMPLEX 45 MIN: CPT

## 2019-01-01 PROCEDURE — 3700000001 HC ADD 15 MINUTES (ANESTHESIA)

## 2019-01-01 PROCEDURE — 83883 ASSAY NEPHELOMETRY NOT SPEC: CPT

## 2019-01-01 PROCEDURE — 99232 SBSQ HOSP IP/OBS MODERATE 35: CPT | Performed by: NEUROLOGICAL SURGERY

## 2019-01-01 PROCEDURE — 99211 OFF/OP EST MAY X REQ PHY/QHP: CPT

## 2019-01-01 PROCEDURE — 96375 TX/PRO/DX INJ NEW DRUG ADDON: CPT

## 2019-01-01 PROCEDURE — 2580000003 HC RX 258: Performed by: NURSE PRACTITIONER

## 2019-01-01 PROCEDURE — 93321 DOPPLER ECHO F-UP/LMTD STD: CPT

## 2019-01-01 PROCEDURE — 2500000003 HC RX 250 WO HCPCS: Performed by: EMERGENCY MEDICINE

## 2019-01-01 PROCEDURE — 84165 PROTEIN E-PHORESIS SERUM: CPT

## 2019-01-01 PROCEDURE — 82728 ASSAY OF FERRITIN: CPT

## 2019-01-01 PROCEDURE — 81003 URINALYSIS AUTO W/O SCOPE: CPT

## 2019-01-01 PROCEDURE — 84207 ASSAY OF VITAMIN B-6: CPT

## 2019-01-01 PROCEDURE — 2580000003 HC RX 258: Performed by: RADIOLOGY

## 2019-01-01 PROCEDURE — 86334 IMMUNOFIX E-PHORESIS SERUM: CPT

## 2019-01-01 PROCEDURE — 99221 1ST HOSP IP/OBS SF/LOW 40: CPT | Performed by: NEUROLOGICAL SURGERY

## 2019-01-01 PROCEDURE — 88237 TISSUE CULTURE BONE MARROW: CPT

## 2019-01-01 PROCEDURE — 82607 VITAMIN B-12: CPT

## 2019-01-01 PROCEDURE — 80202 ASSAY OF VANCOMYCIN: CPT

## 2019-01-01 PROCEDURE — 82746 ASSAY OF FOLIC ACID SERUM: CPT

## 2019-01-01 PROCEDURE — 93325 DOPPLER ECHO COLOR FLOW MAPG: CPT | Performed by: INTERNAL MEDICINE

## 2019-01-01 PROCEDURE — 99222 1ST HOSP IP/OBS MODERATE 55: CPT | Performed by: THORACIC SURGERY (CARDIOTHORACIC VASCULAR SURGERY)

## 2019-01-01 PROCEDURE — 87186 SC STD MICRODIL/AGAR DIL: CPT

## 2019-01-01 PROCEDURE — 78278 ACUTE GI BLOOD LOSS IMAGING: CPT

## 2019-01-01 PROCEDURE — 72131 CT LUMBAR SPINE W/O DYE: CPT

## 2019-01-01 PROCEDURE — 93005 ELECTROCARDIOGRAM TRACING: CPT | Performed by: NURSE PRACTITIONER

## 2019-01-01 PROCEDURE — 3700000000 HC ANESTHESIA ATTENDED CARE

## 2019-01-01 PROCEDURE — 99214 OFFICE O/P EST MOD 30 MIN: CPT

## 2019-01-01 PROCEDURE — G0009 ADMIN PNEUMOCOCCAL VACCINE: HCPCS | Performed by: FAMILY MEDICINE

## 2019-01-01 PROCEDURE — 90670 PCV13 VACCINE IM: CPT | Performed by: FAMILY MEDICINE

## 2019-01-01 PROCEDURE — 99205 OFFICE O/P NEW HI 60 MIN: CPT | Performed by: PAIN MEDICINE

## 2019-01-01 PROCEDURE — 82784 ASSAY IGA/IGD/IGG/IGM EACH: CPT

## 2019-01-01 PROCEDURE — 85610 PROTHROMBIN TIME: CPT

## 2019-01-01 RX ORDER — LISINOPRIL 40 MG/1
40 TABLET ORAL DAILY
Qty: 30 TABLET | Refills: 3 | DISCHARGE
Start: 2019-01-01

## 2019-01-01 RX ORDER — SENNA PLUS 8.6 MG/1
1 TABLET ORAL 2 TIMES DAILY
Qty: 30 TABLET | Refills: 0 | Status: SHIPPED | OUTPATIENT
Start: 2019-01-01 | End: 2019-01-01 | Stop reason: ALTCHOICE

## 2019-01-01 RX ORDER — METOPROLOL TARTRATE 50 MG/1
50 TABLET, FILM COATED ORAL 2 TIMES DAILY
Status: DISCONTINUED | OUTPATIENT
Start: 2019-01-01 | End: 2019-01-01 | Stop reason: SDUPTHER

## 2019-01-01 RX ORDER — METOPROLOL TARTRATE 5 MG/5ML
5 INJECTION INTRAVENOUS ONCE
Status: COMPLETED | OUTPATIENT
Start: 2019-01-01 | End: 2019-01-01

## 2019-01-01 RX ORDER — PNV NO.95/FERROUS FUM/FOLIC AC 28MG-0.8MG
325 TABLET ORAL DAILY
COMMUNITY
End: 2019-01-01 | Stop reason: ALTCHOICE

## 2019-01-01 RX ORDER — KETOROLAC TROMETHAMINE 30 MG/ML
30 INJECTION, SOLUTION INTRAMUSCULAR; INTRAVENOUS ONCE
Status: COMPLETED | OUTPATIENT
Start: 2019-01-01 | End: 2019-01-01

## 2019-01-01 RX ORDER — ATORVASTATIN CALCIUM 10 MG/1
20 TABLET, FILM COATED ORAL DAILY
Status: DISCONTINUED | OUTPATIENT
Start: 2019-01-01 | End: 2019-01-01 | Stop reason: HOSPADM

## 2019-01-01 RX ORDER — FERROUS SULFATE 325(65) MG
325 TABLET ORAL DAILY
Status: DISCONTINUED | OUTPATIENT
Start: 2019-01-01 | End: 2019-01-01 | Stop reason: HOSPADM

## 2019-01-01 RX ORDER — DEXAMETHASONE SODIUM PHOSPHATE 10 MG/ML
10 INJECTION INTRAMUSCULAR; INTRAVENOUS ONCE
Status: COMPLETED | OUTPATIENT
Start: 2019-01-01 | End: 2019-01-01

## 2019-01-01 RX ORDER — LISINOPRIL 20 MG/1
40 TABLET ORAL DAILY
Status: DISCONTINUED | OUTPATIENT
Start: 2019-01-01 | End: 2019-01-01 | Stop reason: HOSPADM

## 2019-01-01 RX ORDER — SODIUM CHLORIDE 0.9 % (FLUSH) 0.9 %
10 SYRINGE (ML) INJECTION EVERY 12 HOURS SCHEDULED
Status: DISCONTINUED | OUTPATIENT
Start: 2019-01-01 | End: 2019-01-01 | Stop reason: HOSPADM

## 2019-01-01 RX ORDER — ORPHENADRINE CITRATE 30 MG/ML
60 INJECTION INTRAMUSCULAR; INTRAVENOUS ONCE
Status: COMPLETED | OUTPATIENT
Start: 2019-01-01 | End: 2019-01-01

## 2019-01-01 RX ORDER — ASPIRIN 325 MG
325 TABLET ORAL ONCE
Status: COMPLETED | OUTPATIENT
Start: 2019-01-01 | End: 2019-01-01

## 2019-01-01 RX ORDER — ACETAMINOPHEN 325 MG/1
650 TABLET ORAL ONCE
Status: COMPLETED | OUTPATIENT
Start: 2019-01-01 | End: 2019-01-01

## 2019-01-01 RX ORDER — SENNA PLUS 8.6 MG/1
1 TABLET ORAL 2 TIMES DAILY
Qty: 60 TABLET | Refills: 0 | Status: SHIPPED | OUTPATIENT
Start: 2019-01-01 | End: 2019-01-01

## 2019-01-01 RX ORDER — NAPROXEN 500 MG/1
500 TABLET ORAL 2 TIMES DAILY
Qty: 15 TABLET | Refills: 0 | Status: SHIPPED | OUTPATIENT
Start: 2019-01-01 | End: 2019-01-01

## 2019-01-01 RX ORDER — NAPROXEN 500 MG/1
500 TABLET ORAL 2 TIMES DAILY
Qty: 14 TABLET | Refills: 0 | Status: SHIPPED | OUTPATIENT
Start: 2019-01-01 | End: 2019-01-01 | Stop reason: SDUPTHER

## 2019-01-01 RX ORDER — ONDANSETRON 2 MG/ML
4 INJECTION INTRAMUSCULAR; INTRAVENOUS EVERY 6 HOURS PRN
Status: DISCONTINUED | OUTPATIENT
Start: 2019-01-01 | End: 2019-01-01 | Stop reason: HOSPADM

## 2019-01-01 RX ORDER — SODIUM CHLORIDE 9 MG/ML
INJECTION, SOLUTION INTRAVENOUS CONTINUOUS
Status: DISCONTINUED | OUTPATIENT
Start: 2019-01-01 | End: 2019-01-01 | Stop reason: HOSPADM

## 2019-01-01 RX ORDER — MORPHINE SULFATE 2 MG/ML
2 INJECTION, SOLUTION INTRAMUSCULAR; INTRAVENOUS ONCE
Status: COMPLETED | OUTPATIENT
Start: 2019-01-01 | End: 2019-01-01

## 2019-01-01 RX ORDER — CYCLOBENZAPRINE HCL 10 MG
10 TABLET ORAL 3 TIMES DAILY PRN
Status: DISCONTINUED | OUTPATIENT
Start: 2019-01-01 | End: 2019-01-01 | Stop reason: HOSPADM

## 2019-01-01 RX ORDER — MORPHINE SULFATE 4 MG/ML
4 INJECTION, SOLUTION INTRAMUSCULAR; INTRAVENOUS EVERY 4 HOURS PRN
Status: DISCONTINUED | OUTPATIENT
Start: 2019-01-01 | End: 2019-01-01 | Stop reason: HOSPADM

## 2019-01-01 RX ORDER — SODIUM CHLORIDE 0.9 % (FLUSH) 0.9 %
10 SYRINGE (ML) INJECTION PRN
Status: DISCONTINUED | OUTPATIENT
Start: 2019-01-01 | End: 2019-01-01 | Stop reason: SDUPTHER

## 2019-01-01 RX ORDER — PROPOFOL 10 MG/ML
INJECTION, EMULSION INTRAVENOUS PRN
Status: DISCONTINUED | OUTPATIENT
Start: 2019-01-01 | End: 2019-01-01 | Stop reason: SDUPTHER

## 2019-01-01 RX ORDER — OXYCODONE HYDROCHLORIDE AND ACETAMINOPHEN 5; 325 MG/1; MG/1
1 TABLET ORAL EVERY 6 HOURS PRN
Qty: 10 TABLET | Refills: 0 | Status: SHIPPED | OUTPATIENT
Start: 2019-01-01 | End: 2019-01-01

## 2019-01-01 RX ORDER — SODIUM CHLORIDE 0.9 % (FLUSH) 0.9 %
10 SYRINGE (ML) INJECTION EVERY 12 HOURS SCHEDULED
Status: DISCONTINUED | OUTPATIENT
Start: 2019-01-01 | End: 2019-01-01 | Stop reason: SDUPTHER

## 2019-01-01 RX ORDER — CYCLOBENZAPRINE HCL 5 MG
5 TABLET ORAL 3 TIMES DAILY PRN
Qty: 14 TABLET | Refills: 0 | Status: SHIPPED | OUTPATIENT
Start: 2019-01-01 | End: 2019-01-01 | Stop reason: SDUPTHER

## 2019-01-01 RX ORDER — CYCLOBENZAPRINE HCL 5 MG
5 TABLET ORAL 3 TIMES DAILY PRN
Qty: 15 TABLET | Refills: 0 | Status: SHIPPED | OUTPATIENT
Start: 2019-01-01 | End: 2019-01-01 | Stop reason: SDUPTHER

## 2019-01-01 RX ORDER — DEXAMETHASONE SODIUM PHOSPHATE 4 MG/ML
4 INJECTION, SOLUTION INTRA-ARTICULAR; INTRALESIONAL; INTRAMUSCULAR; INTRAVENOUS; SOFT TISSUE EVERY 6 HOURS
Status: DISCONTINUED | OUTPATIENT
Start: 2019-01-01 | End: 2019-01-01 | Stop reason: HOSPADM

## 2019-01-01 RX ORDER — SODIUM CHLORIDE 0.9 % (FLUSH) 0.9 %
10 SYRINGE (ML) INJECTION PRN
Status: DISCONTINUED | OUTPATIENT
Start: 2019-01-01 | End: 2019-01-01 | Stop reason: HOSPADM

## 2019-01-01 RX ORDER — MORPHINE SULFATE 4 MG/ML
4 INJECTION, SOLUTION INTRAMUSCULAR; INTRAVENOUS ONCE
Status: COMPLETED | OUTPATIENT
Start: 2019-01-01 | End: 2019-01-01

## 2019-01-01 RX ORDER — CYCLOBENZAPRINE HCL 5 MG
5 TABLET ORAL 3 TIMES DAILY PRN
Qty: 14 TABLET | Refills: 0 | Status: SHIPPED | OUTPATIENT
Start: 2019-01-01 | End: 2019-01-01

## 2019-01-01 RX ORDER — AMLODIPINE BESYLATE 5 MG/1
5 TABLET ORAL DAILY
Status: DISCONTINUED | OUTPATIENT
Start: 2019-01-01 | End: 2019-01-01 | Stop reason: HOSPADM

## 2019-01-01 RX ORDER — METOPROLOL TARTRATE 50 MG/1
50 TABLET, FILM COATED ORAL 2 TIMES DAILY
Qty: 60 TABLET | Refills: 3 | DISCHARGE
Start: 2019-01-01

## 2019-01-01 RX ORDER — OXYCODONE HYDROCHLORIDE AND ACETAMINOPHEN 5; 325 MG/1; MG/1
1 TABLET ORAL EVERY 6 HOURS PRN
Qty: 12 TABLET | Refills: 0 | Status: SHIPPED | OUTPATIENT
Start: 2019-01-01 | End: 2019-01-01

## 2019-01-01 RX ORDER — SENNA PLUS 8.6 MG/1
1 TABLET ORAL 2 TIMES DAILY
Status: DISCONTINUED | OUTPATIENT
Start: 2019-01-01 | End: 2019-01-01 | Stop reason: HOSPADM

## 2019-01-01 RX ORDER — ATORVASTATIN CALCIUM 20 MG/1
20 TABLET, FILM COATED ORAL DAILY
Qty: 30 TABLET | Refills: 3 | DISCHARGE
Start: 2019-01-01

## 2019-01-01 RX ORDER — OXYCODONE HYDROCHLORIDE AND ACETAMINOPHEN 5; 325 MG/1; MG/1
1 TABLET ORAL EVERY 4 HOURS PRN
Status: DISCONTINUED | OUTPATIENT
Start: 2019-01-01 | End: 2019-01-01 | Stop reason: HOSPADM

## 2019-01-01 RX ORDER — SENNA PLUS 8.6 MG/1
1 TABLET ORAL 2 TIMES DAILY
Qty: 30 TABLET | Refills: 0 | Status: SHIPPED | OUTPATIENT
Start: 2019-01-01 | End: 2019-01-01

## 2019-01-01 RX ORDER — OXYCODONE HYDROCHLORIDE AND ACETAMINOPHEN 5; 325 MG/1; MG/1
1 TABLET ORAL ONCE
Status: COMPLETED | OUTPATIENT
Start: 2019-01-01 | End: 2019-01-01

## 2019-01-01 RX ADMIN — SENNOSIDES 8.6 MG: 8.6 TABLET, FILM COATED ORAL at 09:23

## 2019-01-01 RX ADMIN — LISINOPRIL 40 MG: 20 TABLET ORAL at 09:49

## 2019-01-01 RX ADMIN — Medication 10 ML: at 07:41

## 2019-01-01 RX ADMIN — MORPHINE SULFATE 2 MG: 2 INJECTION, SOLUTION INTRAMUSCULAR; INTRAVENOUS at 01:26

## 2019-01-01 RX ADMIN — Medication 10 ML: at 18:47

## 2019-01-01 RX ADMIN — AMLODIPINE BESYLATE 5 MG: 5 TABLET ORAL at 08:58

## 2019-01-01 RX ADMIN — PSYLLIUM HUSK 1 PACKET: 3.4 GRANULE ORAL at 07:52

## 2019-01-01 RX ADMIN — OXYCODONE AND ACETAMINOPHEN 1 TABLET: 5; 325 TABLET ORAL at 23:50

## 2019-01-01 RX ADMIN — DEXAMETHASONE SODIUM PHOSPHATE 4 MG: 4 INJECTION, SOLUTION INTRA-ARTICULAR; INTRALESIONAL; INTRAMUSCULAR; INTRAVENOUS; SOFT TISSUE at 06:40

## 2019-01-01 RX ADMIN — ENOXAPARIN SODIUM 40 MG: 40 INJECTION SUBCUTANEOUS at 07:54

## 2019-01-01 RX ADMIN — OXYCODONE AND ACETAMINOPHEN 1 TABLET: 5; 325 TABLET ORAL at 07:23

## 2019-01-01 RX ADMIN — ATORVASTATIN CALCIUM 20 MG: 10 TABLET, FILM COATED ORAL at 08:50

## 2019-01-01 RX ADMIN — VANCOMYCIN HYDROCHLORIDE 1250 MG: 10 INJECTION, POWDER, LYOPHILIZED, FOR SOLUTION INTRAVENOUS at 15:51

## 2019-01-01 RX ADMIN — Medication 25 MILLICURIE: at 12:35

## 2019-01-01 RX ADMIN — SODIUM CHLORIDE: 9 INJECTION, SOLUTION INTRAVENOUS at 00:18

## 2019-01-01 RX ADMIN — VANCOMYCIN HYDROCHLORIDE 1250 MG: 10 INJECTION, POWDER, LYOPHILIZED, FOR SOLUTION INTRAVENOUS at 04:02

## 2019-01-01 RX ADMIN — Medication 10 ML: at 13:03

## 2019-01-01 RX ADMIN — ORPHENADRINE CITRATE 60 MG: 60 INJECTION INTRAMUSCULAR; INTRAVENOUS at 20:46

## 2019-01-01 RX ADMIN — ATORVASTATIN CALCIUM 20 MG: 10 TABLET, FILM COATED ORAL at 09:37

## 2019-01-01 RX ADMIN — WATER 2 G: 1 INJECTION INTRAMUSCULAR; INTRAVENOUS; SUBCUTANEOUS at 12:00

## 2019-01-01 RX ADMIN — CYCLOBENZAPRINE 10 MG: 10 TABLET, FILM COATED ORAL at 18:51

## 2019-01-01 RX ADMIN — PSYLLIUM HUSK 1 PACKET: 3.4 GRANULE ORAL at 11:25

## 2019-01-01 RX ADMIN — Medication 10 ML: at 08:10

## 2019-01-01 RX ADMIN — METOPROLOL TARTRATE 25 MG: 25 TABLET ORAL at 08:10

## 2019-01-01 RX ADMIN — DEXAMETHASONE SODIUM PHOSPHATE 4 MG: 4 INJECTION, SOLUTION INTRA-ARTICULAR; INTRALESIONAL; INTRAMUSCULAR; INTRAVENOUS; SOFT TISSUE at 18:05

## 2019-01-01 RX ADMIN — SODIUM CHLORIDE: 9 INJECTION, SOLUTION INTRAVENOUS at 22:40

## 2019-01-01 RX ADMIN — OXYCODONE AND ACETAMINOPHEN 1 TABLET: 5; 325 TABLET ORAL at 13:39

## 2019-01-01 RX ADMIN — METOPROLOL TARTRATE 50 MG: 50 TABLET, FILM COATED ORAL at 20:11

## 2019-01-01 RX ADMIN — LISINOPRIL 40 MG: 20 TABLET ORAL at 08:10

## 2019-01-01 RX ADMIN — OXYCODONE AND ACETAMINOPHEN 1 TABLET: 5; 325 TABLET ORAL at 15:48

## 2019-01-01 RX ADMIN — Medication 10 ML: at 18:44

## 2019-01-01 RX ADMIN — ATORVASTATIN CALCIUM 20 MG: 10 TABLET, FILM COATED ORAL at 09:23

## 2019-01-01 RX ADMIN — LISINOPRIL 40 MG: 20 TABLET ORAL at 10:00

## 2019-01-01 RX ADMIN — DEXAMETHASONE SODIUM PHOSPHATE 4 MG: 4 INJECTION, SOLUTION INTRA-ARTICULAR; INTRALESIONAL; INTRAMUSCULAR; INTRAVENOUS; SOFT TISSUE at 00:11

## 2019-01-01 RX ADMIN — ATORVASTATIN CALCIUM 20 MG: 10 TABLET, FILM COATED ORAL at 09:49

## 2019-01-01 RX ADMIN — OXYCODONE HYDROCHLORIDE AND ACETAMINOPHEN 1 TABLET: 5; 325 TABLET ORAL at 21:46

## 2019-01-01 RX ADMIN — WATER 2 G: 1 INJECTION INTRAMUSCULAR; INTRAVENOUS; SUBCUTANEOUS at 14:42

## 2019-01-01 RX ADMIN — METOPROLOL TARTRATE 75 MG: 25 TABLET ORAL at 07:51

## 2019-01-01 RX ADMIN — DEXAMETHASONE SODIUM PHOSPHATE 4 MG: 4 INJECTION, SOLUTION INTRA-ARTICULAR; INTRALESIONAL; INTRAMUSCULAR; INTRAVENOUS; SOFT TISSUE at 01:12

## 2019-01-01 RX ADMIN — CYCLOBENZAPRINE 10 MG: 10 TABLET, FILM COATED ORAL at 23:50

## 2019-01-01 RX ADMIN — DEXAMETHASONE SODIUM PHOSPHATE 10 MG: 10 INJECTION INTRAMUSCULAR; INTRAVENOUS at 01:27

## 2019-01-01 RX ADMIN — LISINOPRIL 40 MG: 20 TABLET ORAL at 08:58

## 2019-01-01 RX ADMIN — OXYCODONE AND ACETAMINOPHEN 1 TABLET: 5; 325 TABLET ORAL at 11:05

## 2019-01-01 RX ADMIN — MORPHINE SULFATE 4 MG: 4 INJECTION INTRAVENOUS at 03:25

## 2019-01-01 RX ADMIN — DEXAMETHASONE SODIUM PHOSPHATE 4 MG: 4 INJECTION, SOLUTION INTRA-ARTICULAR; INTRALESIONAL; INTRAMUSCULAR; INTRAVENOUS; SOFT TISSUE at 12:52

## 2019-01-01 RX ADMIN — METOPROLOL TARTRATE 25 MG: 25 TABLET ORAL at 11:25

## 2019-01-01 RX ADMIN — MORPHINE SULFATE 4 MG: 4 INJECTION INTRAVENOUS at 07:41

## 2019-01-01 RX ADMIN — METOPROLOL TARTRATE 5 MG: 1 INJECTION, SOLUTION INTRAVENOUS at 02:39

## 2019-01-01 RX ADMIN — LISINOPRIL 40 MG: 20 TABLET ORAL at 09:23

## 2019-01-01 RX ADMIN — Medication 10 ML: at 20:49

## 2019-01-01 RX ADMIN — METOPROLOL TARTRATE 25 MG: 25 TABLET ORAL at 20:35

## 2019-01-01 RX ADMIN — DEXAMETHASONE SODIUM PHOSPHATE 4 MG: 4 INJECTION, SOLUTION INTRA-ARTICULAR; INTRALESIONAL; INTRAMUSCULAR; INTRAVENOUS; SOFT TISSUE at 12:35

## 2019-01-01 RX ADMIN — Medication 10 ML: at 06:40

## 2019-01-01 RX ADMIN — Medication 10 ML: at 01:12

## 2019-01-01 RX ADMIN — LISINOPRIL 40 MG: 20 TABLET ORAL at 07:51

## 2019-01-01 RX ADMIN — METOPROLOL TARTRATE 75 MG: 25 TABLET ORAL at 14:56

## 2019-01-01 RX ADMIN — ATORVASTATIN CALCIUM 20 MG: 10 TABLET, FILM COATED ORAL at 08:10

## 2019-01-01 RX ADMIN — DEXAMETHASONE SODIUM PHOSPHATE 4 MG: 4 INJECTION, SOLUTION INTRA-ARTICULAR; INTRALESIONAL; INTRAMUSCULAR; INTRAVENOUS; SOFT TISSUE at 00:54

## 2019-01-01 RX ADMIN — DEXAMETHASONE SODIUM PHOSPHATE 4 MG: 4 INJECTION, SOLUTION INTRA-ARTICULAR; INTRALESIONAL; INTRAMUSCULAR; INTRAVENOUS; SOFT TISSUE at 12:47

## 2019-01-01 RX ADMIN — AMLODIPINE BESYLATE 5 MG: 5 TABLET ORAL at 09:23

## 2019-01-01 RX ADMIN — SODIUM CHLORIDE: 9 INJECTION, SOLUTION INTRAVENOUS at 14:16

## 2019-01-01 RX ADMIN — ATORVASTATIN CALCIUM 20 MG: 10 TABLET, FILM COATED ORAL at 07:51

## 2019-01-01 RX ADMIN — Medication 10 ML: at 20:11

## 2019-01-01 RX ADMIN — ATORVASTATIN CALCIUM 20 MG: 10 TABLET, FILM COATED ORAL at 09:01

## 2019-01-01 RX ADMIN — FERROUS SULFATE TAB 325 MG (65 MG ELEMENTAL FE) 325 MG: 325 (65 FE) TAB at 09:03

## 2019-01-01 RX ADMIN — DEXTROSE MONOHYDRATE 4 MILLION UNITS: 50 INJECTION, SOLUTION INTRAVENOUS at 14:04

## 2019-01-01 RX ADMIN — Medication 10 ML: at 12:01

## 2019-01-01 RX ADMIN — Medication 10 ML: at 18:06

## 2019-01-01 RX ADMIN — OXYCODONE AND ACETAMINOPHEN 1 TABLET: 5; 325 TABLET ORAL at 16:11

## 2019-01-01 RX ADMIN — ASPIRIN 325 MG: 325 TABLET, COATED ORAL at 02:38

## 2019-01-01 RX ADMIN — DEXAMETHASONE SODIUM PHOSPHATE 4 MG: 4 INJECTION, SOLUTION INTRA-ARTICULAR; INTRALESIONAL; INTRAMUSCULAR; INTRAVENOUS; SOFT TISSUE at 18:47

## 2019-01-01 RX ADMIN — OXYCODONE AND ACETAMINOPHEN 1 TABLET: 5; 325 TABLET ORAL at 19:40

## 2019-01-01 RX ADMIN — Medication 10 ML: at 20:41

## 2019-01-01 RX ADMIN — DEXAMETHASONE SODIUM PHOSPHATE 4 MG: 4 INJECTION, SOLUTION INTRA-ARTICULAR; INTRALESIONAL; INTRAMUSCULAR; INTRAVENOUS; SOFT TISSUE at 06:38

## 2019-01-01 RX ADMIN — Medication 10 ML: at 08:51

## 2019-01-01 RX ADMIN — METOPROLOL TARTRATE 50 MG: 50 TABLET, FILM COATED ORAL at 09:50

## 2019-01-01 RX ADMIN — PSYLLIUM HUSK 1 PACKET: 3.4 GRANULE ORAL at 09:49

## 2019-01-01 RX ADMIN — DEXAMETHASONE SODIUM PHOSPHATE 4 MG: 4 INJECTION, SOLUTION INTRA-ARTICULAR; INTRALESIONAL; INTRAMUSCULAR; INTRAVENOUS; SOFT TISSUE at 06:32

## 2019-01-01 RX ADMIN — AMLODIPINE BESYLATE 5 MG: 5 TABLET ORAL at 10:00

## 2019-01-01 RX ADMIN — PNEUMOCOCCAL 13-VALENT CONJUGATE VACCINE 0.5 ML: 2.2; 2.2; 2.2; 2.2; 2.2; 4.4; 2.2; 2.2; 2.2; 2.2; 2.2; 2.2; 2.2 INJECTION, SUSPENSION INTRAMUSCULAR at 12:53

## 2019-01-01 RX ADMIN — Medication 10 ML: at 11:26

## 2019-01-01 RX ADMIN — Medication 10 ML: at 20:08

## 2019-01-01 RX ADMIN — KETOROLAC TROMETHAMINE 30 MG: 30 INJECTION, SOLUTION INTRAMUSCULAR; INTRAVENOUS at 22:47

## 2019-01-01 RX ADMIN — VANCOMYCIN HYDROCHLORIDE 1250 MG: 10 INJECTION, POWDER, LYOPHILIZED, FOR SOLUTION INTRAVENOUS at 03:18

## 2019-01-01 RX ADMIN — Medication 10 ML: at 09:24

## 2019-01-01 RX ADMIN — PROPOFOL 220 MG: 10 INJECTION, EMULSION INTRAVENOUS at 10:30

## 2019-01-01 RX ADMIN — LISINOPRIL 40 MG: 20 TABLET ORAL at 11:25

## 2019-01-01 RX ADMIN — DEXAMETHASONE SODIUM PHOSPHATE 4 MG: 4 INJECTION, SOLUTION INTRA-ARTICULAR; INTRALESIONAL; INTRAMUSCULAR; INTRAVENOUS; SOFT TISSUE at 18:44

## 2019-01-01 RX ADMIN — ENOXAPARIN SODIUM 40 MG: 40 INJECTION SUBCUTANEOUS at 08:10

## 2019-01-01 RX ADMIN — Medication 10 ML: at 09:49

## 2019-01-01 RX ADMIN — SENNOSIDES 8.6 MG: 8.6 TABLET, FILM COATED ORAL at 09:37

## 2019-01-01 RX ADMIN — DEXAMETHASONE SODIUM PHOSPHATE 4 MG: 4 INJECTION, SOLUTION INTRA-ARTICULAR; INTRALESIONAL; INTRAMUSCULAR; INTRAVENOUS; SOFT TISSUE at 06:35

## 2019-01-01 RX ADMIN — SENNOSIDES 8.6 MG: 8.6 TABLET, FILM COATED ORAL at 20:08

## 2019-01-01 RX ADMIN — Medication 10 ML: at 00:54

## 2019-01-01 RX ADMIN — Medication 10 ML: at 07:55

## 2019-01-01 RX ADMIN — Medication 10 ML: at 09:37

## 2019-01-01 RX ADMIN — FERROUS SULFATE TAB 325 MG (65 MG ELEMENTAL FE) 325 MG: 325 (65 FE) TAB at 09:23

## 2019-01-01 RX ADMIN — VANCOMYCIN HYDROCHLORIDE 1250 MG: 10 INJECTION, POWDER, LYOPHILIZED, FOR SOLUTION INTRAVENOUS at 18:33

## 2019-01-01 RX ADMIN — LISINOPRIL 40 MG: 20 TABLET ORAL at 09:37

## 2019-01-01 RX ADMIN — ATORVASTATIN CALCIUM 20 MG: 10 TABLET, FILM COATED ORAL at 11:25

## 2019-01-01 RX ADMIN — MAGNESIUM HYDROXIDE 30 ML: 400 SUSPENSION ORAL at 12:48

## 2019-01-01 RX ADMIN — SENNOSIDES 8.6 MG: 8.6 TABLET, FILM COATED ORAL at 20:49

## 2019-01-01 RX ADMIN — DEXTROSE MONOHYDRATE 4 MILLION UNITS: 50 INJECTION, SOLUTION INTRAVENOUS at 17:38

## 2019-01-01 RX ADMIN — DEXAMETHASONE SODIUM PHOSPHATE 4 MG: 4 INJECTION, SOLUTION INTRA-ARTICULAR; INTRALESIONAL; INTRAMUSCULAR; INTRAVENOUS; SOFT TISSUE at 13:03

## 2019-01-01 RX ADMIN — METOPROLOL TARTRATE 25 MG: 50 TABLET, FILM COATED ORAL at 09:46

## 2019-01-01 RX ADMIN — FERROUS SULFATE TAB 325 MG (65 MG ELEMENTAL FE) 325 MG: 325 (65 FE) TAB at 08:50

## 2019-01-01 RX ADMIN — AMLODIPINE BESYLATE 5 MG: 5 TABLET ORAL at 09:37

## 2019-01-01 RX ADMIN — ACETAMINOPHEN 650 MG: 325 TABLET ORAL at 12:39

## 2019-01-01 RX ADMIN — PSYLLIUM HUSK 1 PACKET: 3.4 GRANULE ORAL at 08:10

## 2019-01-01 RX ADMIN — Medication 10 ML: at 06:35

## 2019-01-01 RX ADMIN — FERROUS SULFATE TAB 325 MG (65 MG ELEMENTAL FE) 325 MG: 325 (65 FE) TAB at 09:37

## 2019-01-01 SDOH — ECONOMIC STABILITY: HOUSING INSECURITY: PLEASE ASSESS YOUR PATIENT'S LEVEL OF DISTRESS CONCERNING HOUSING (SCALE FROM 1-10): 0 (NONE)

## 2019-01-01 ASSESSMENT — PAIN DESCRIPTION - DESCRIPTORS
DESCRIPTORS: ACHING;DISCOMFORT;SORE
DESCRIPTORS: ACHING;CONSTANT;DISCOMFORT
DESCRIPTORS: ACHING
DESCRIPTORS: ACHING;DISCOMFORT;SORE
DESCRIPTORS: ACHING;CONSTANT;DISCOMFORT
DESCRIPTORS: BURNING;CONSTANT;DISCOMFORT
DESCRIPTORS: ACHING
DESCRIPTORS: CONSTANT;DISCOMFORT;ACHING
DESCRIPTORS: ACHING;CONSTANT;DISCOMFORT
DESCRIPTORS: ACHING;DISCOMFORT;DULL
DESCRIPTORS: ACHING;CONSTANT;DISCOMFORT
DESCRIPTORS: ACHING;DISCOMFORT;SORE
DESCRIPTORS: ACHING;DISCOMFORT;SORE
DESCRIPTORS: CONSTANT;DISCOMFORT
DESCRIPTORS: BURNING;CONSTANT;DISCOMFORT
DESCRIPTORS: ACHING;DISCOMFORT;SORE
DESCRIPTORS: CONSTANT;DISCOMFORT;SHARP

## 2019-01-01 ASSESSMENT — PAIN SCALES - GENERAL
PAINLEVEL_OUTOF10: 0
PAINLEVEL_OUTOF10: 5
PAINLEVEL_OUTOF10: 10
PAINLEVEL_OUTOF10: 9
PAINLEVEL_OUTOF10: 10
PAINLEVEL_OUTOF10: 10
PAINLEVEL_OUTOF10: 5
PAINLEVEL_OUTOF10: 10
PAINLEVEL_OUTOF10: 2
PAINLEVEL_OUTOF10: 0
PAINLEVEL_OUTOF10: 5
PAINLEVEL_OUTOF10: 4
PAINLEVEL_OUTOF10: 0
PAINLEVEL_OUTOF10: 0
PAINLEVEL_OUTOF10: 10
PAINLEVEL_OUTOF10: 5
PAINLEVEL_OUTOF10: 0
PAINLEVEL_OUTOF10: 8
PAINLEVEL_OUTOF10: 4
PAINLEVEL_OUTOF10: 8
PAINLEVEL_OUTOF10: 4
PAINLEVEL_OUTOF10: 0
PAINLEVEL_OUTOF10: 10
PAINLEVEL_OUTOF10: 4
PAINLEVEL_OUTOF10: 0
PAINLEVEL_OUTOF10: 0
PAINLEVEL_OUTOF10: 10
PAINLEVEL_OUTOF10: 0
PAINLEVEL_OUTOF10: 0
PAINLEVEL_OUTOF10: 4
PAINLEVEL_OUTOF10: 10
PAINLEVEL_OUTOF10: 0
PAINLEVEL_OUTOF10: 5
PAINLEVEL_OUTOF10: 2
PAINLEVEL_OUTOF10: 0
PAINLEVEL_OUTOF10: 2
PAINLEVEL_OUTOF10: 6
PAINLEVEL_OUTOF10: 10

## 2019-01-01 ASSESSMENT — PAIN DESCRIPTION - ORIENTATION
ORIENTATION: LOWER
ORIENTATION: MID;LOWER
ORIENTATION: LOWER
ORIENTATION: MID;LOWER
ORIENTATION: LOWER
ORIENTATION: MID
ORIENTATION: LOWER
ORIENTATION: MID
ORIENTATION: LOWER
ORIENTATION: LOWER
ORIENTATION: MID
ORIENTATION: LOWER

## 2019-01-01 ASSESSMENT — PAIN DESCRIPTION - LOCATION
LOCATION: BACK

## 2019-01-01 ASSESSMENT — PAIN DESCRIPTION - PROGRESSION
CLINICAL_PROGRESSION: NOT CHANGED

## 2019-01-01 ASSESSMENT — PAIN DESCRIPTION - PAIN TYPE
TYPE: CHRONIC PAIN
TYPE: ACUTE PAIN
TYPE: CHRONIC PAIN
TYPE: ACUTE PAIN
TYPE: CHRONIC PAIN
TYPE: ACUTE PAIN
TYPE: CHRONIC PAIN

## 2019-01-01 ASSESSMENT — PAIN DESCRIPTION - FREQUENCY
FREQUENCY: INTERMITTENT
FREQUENCY: CONTINUOUS
FREQUENCY: CONTINUOUS
FREQUENCY: INTERMITTENT
FREQUENCY: INTERMITTENT
FREQUENCY: CONTINUOUS
FREQUENCY: INTERMITTENT
FREQUENCY: INTERMITTENT
FREQUENCY: CONTINUOUS
FREQUENCY: INTERMITTENT
FREQUENCY: CONTINUOUS
FREQUENCY: INTERMITTENT

## 2019-01-01 ASSESSMENT — PAIN - FUNCTIONAL ASSESSMENT
PAIN_FUNCTIONAL_ASSESSMENT: PREVENTS OR INTERFERES SOME ACTIVE ACTIVITIES AND ADLS
PAIN_FUNCTIONAL_ASSESSMENT: PREVENTS OR INTERFERES SOME ACTIVE ACTIVITIES AND ADLS
PAIN_FUNCTIONAL_ASSESSMENT: PREVENTS OR INTERFERES WITH ALL ACTIVE AND SOME PASSIVE ACTIVITIES
PAIN_FUNCTIONAL_ASSESSMENT: PREVENTS OR INTERFERES SOME ACTIVE ACTIVITIES AND ADLS
PAIN_FUNCTIONAL_ASSESSMENT: PREVENTS OR INTERFERES SOME ACTIVE ACTIVITIES AND ADLS
PAIN_FUNCTIONAL_ASSESSMENT: PREVENTS OR INTERFERES WITH MANY ACTIVE NOT PASSIVE ACTIVITIES

## 2019-01-01 ASSESSMENT — PAIN DESCRIPTION - ONSET
ONSET: ON-GOING
ONSET: PROGRESSIVE
ONSET: ON-GOING

## 2019-03-21 PROBLEM — E44.1 MILD PROTEIN-ENERGY MALNUTRITION (HCC): Status: ACTIVE | Noted: 2019-01-01

## 2019-03-21 PROBLEM — M51.26 LUMBAR DISC HERNIATION: Status: ACTIVE | Noted: 2019-01-01

## 2019-03-21 NOTE — PLAN OF CARE
Problem: Pain:  Goal: Pain level will decrease  Description  Pain level will decrease  Outcome: Met This Shift     Problem: Falls - Risk of:  Goal: Will remain free from falls  Description  Will remain free from falls  Outcome: Met This Shift  Goal: Absence of physical injury  Description  Absence of physical injury  Outcome: Met This Shift

## 2019-03-21 NOTE — H&P
Hospital Medicine History & Physical      PCP: Jazmín Rice DO    Date of Admission: 3/20/2019    Date of Service: Pt seen/examined on 03/21/19 and Admitted to Inpatient with expected LOS greater than two midnights due to medical therapy. Chief Complaint:  Back pain     History Of Present Illness:  Records reviewed. This is a  79 y.o. male who presented to [address] [place] [address] with back pain. Brought in by EMS. PMH of HTN, HLD, VHD, prostate CA. The pt reports having achy pain to his low mid back. Pain is non radiating. No injury or fall. Per pt, he has not had similar pain before. Pt denies any focal weakness, numbness/tingling, fever, chest pain, sob, abdominal pain, nausea, emesis, diarrhea, urinary sx, or any further complaints. There is no incontinence. He has no radiation of the back pain. ED course: 141/72, HR 98, afebrile and 98% RA. CT lumbar spine showed multilevel disc disease with herniation at L4-L5. Labs showed glucose elevated at 175,, WBC 11.6. He was treated with toradol 30 mg, IV morphine. Neurosurgery was consulted.      Past Medical History:          Diagnosis Date    Aortic insufficiency     Aortic regurgitation     Hyperlipidemia     Hypertension     Mitral regurgitation     Prostate CA [facility])        Past Surgical History:          Procedure Laterality Date    ABLATION OF DYSRHYTHMIC FOCUS  05/29/2013    SVT ABLATION     CARDIAC CATHETERIZATION  4/2013    HEMORRHOID SURGERY      HERNIA REPAIR         Medications Prior to Admission:      Prior to Admission medications    Medication Sig Start Date End Date Taking?  Authorizing Provider   naproxen (NAPROSYN) 500 MG tablet Take 1 tablet by mouth 2 times daily for 7 days 3/20/19 3/27/19 Yes Chun Olson MD   cyclobenzaprine (FLEXERIL) 5 MG tablet Take 1 tablet by mouth 3 times daily as needed for Muscle spasms 3/20/19 3/25/19 Yes Chun Olosn MD   Ferrous Sulfate (IRON) 325 (65 Fe) MG TABS Take by mouth daily   Yes Historical Provider, MD   amLODIPine (NORVASC) 5 MG tablet TAKE 1 TABLET BY MOUTH EVERY DAY 2/13/19  Yes Andriy Palacios DO   atorvastatin (LIPITOR) 20 MG tablet TAKE 1 TABLET BY MOUTH EVERY DAY 7/16/18  Yes Andriy Palacios DO   lisinopril (PRINIVIL;ZESTRIL) 40 MG tablet TAKE 1 TABLET BY MOUTH EVERY DAY 4/6/18  Yes Andriy Palacios DO   Psyllium (METAMUCIL FREE & NATURAL) 43 % POWD Take by mouth   Yes Historical Provider, MD   Multiple Vitamins-Minerals (ONE-A-DAY 50 PLUS PO) Take  by mouth. Yes Historical Provider, MD   Omega-3 Fatty Acids (FISH OIL) 1000 MG CAPS Take 2,000 mg by mouth daily     Historical Provider, MD       Allergies:  Patient has no known allergies. Social History:      The patient currently lives home     TOBACCO:   reports that he has never smoked. He has never used smokeless tobacco.  ETOH:   reports that he does not drink alcohol. Family History:       Reviewed in detail and negative for DM, CAD, Cancer, CVA. Positive as follows:        Problem Relation Age of Onset    Stroke Mother     Heart Attack Father        REVIEW OF SYSTEMS:   Pertinent positives as noted in the HPI. All other systems reviewed and negative. PHYSICAL EXAM:    BP (!) 145/65   Pulse 86   Temp 96.9 °F (36.1 °C) (Temporal)   Resp 18   Ht 5' 8\" (1.727 m)   Wt 161 lb (73 kg)   SpO2 96%   BMI 24.48 kg/m²     General appearance:  No apparent distress, appears stated age and cooperative. HEENT:  Normal cephalic, atraumatic without obvious deformity. Pupils equal, round, and reactive to light. Extra ocular muscles intact. Conjunctivae/corneas clear. Neck: Supple, with full range of motion. No jugular venous distention. Trachea midline. Respiratory:  Normal respiratory effort. Clear to auscultation, bilaterally without Rales/Wheezes/Rhonchi. Cardiovascular:  Regular rate and rhythm with normal S1/S2 without murmurs, rubs or gallops.   Abdomen: Soft, non-tender, non-distended with normal bowel sounds. Musculoskeletal:  No clubbing, cyanosis or edema bilaterally. Full range of motion without deformity. Skin: Skin color, texture, turgor normal.  No rashes or lesions. Neurologic:  Neurovascularly intact without any focal sensory/motor deficits. Psychiatric:  Alert and oriented, thought content appropriate, normal insight  Capillary Refill: Brisk,< 3 seconds   Peripheral Pulses: +2 palpable, equal bilaterally     Ct Lumbar Spine Wo Contrast  Result Date: 3/20/2019  NO ACUTE FRACTURE OR SIGNIFICANT SUBLUXATION IS IDENTIFIED Multilevel disc disease with herniation as described above. Patient is amenable for epidural steroid injection of facet joint blocks for pain management. .     Labs:     Recent Labs     03/20/19 2045   WBC 11.6*   HGB 10.2*   HCT 31.7*        Recent Labs     03/20/19 2045 03/21/19  0637    139   K 4.3 4.2   CL 98 101   CO2 25 26   BUN 14 15   CREATININE 0.9 0.9   CALCIUM 8.9 9.1     No results for input(s): AST, ALT, BILIDIR, BILITOT, ALKPHOS in the last 72 hours. Recent Labs     03/21/19  0637   INR 1.4     No results for input(s): Georgiana Dark in the last 72 hours. Urinalysis:      Lab Results   Component Value Date    NITRU Negative 03/21/2019    WBCUA 0-1 03/21/2019    BACTERIA FEW 03/21/2019    RBCUA NONE 03/21/2019    BLOODU Negative 03/21/2019    SPECGRAV 1.010 03/21/2019    GLUCOSEU Negative 03/21/2019         ASSESSMENT:    Active Hospital Problems    Diagnosis Date Noted    Lumbar disc herniation [M51.26] 03/21/2019     Lumbar disc herniation   - no radiculopathy; sudden on set of back pain     HTN  - stable      Elevated Hemoglobin A1C  - no hx of diabetes and no previous abnormal labs on file.    Lab Results   Component Value Date    LABA1C 6.6 (H) 03/21/2019     Elevated procalcitonin     Severe aortic regurgitation   - following with [facility] at [facility]   - planning for LIZZY soon     Jehovah witness       PLAN:    MRI lumbar spine   NS consult Continue decadron and pain meds   Home meds   Ok to eat     DVT Prophylaxis: SCDs   Diet: DIET GENERAL;  Code Status: Full Code    PT/OT Eval Status: ordered     Dispo - general floor admit        MALLORY Polanco CNP    Thank you Travis Corona DO for the opportunity to be involved in this patient's care.  If you have any questions or concerns please feel free to contact me at [phone]

## 2019-03-21 NOTE — PLAN OF CARE
Problem: Involuntary weight loss (NC-3.2)  Goal: Food and/or Nutrient Delivery  Description: add ONS daily at breakfast  Individualized approach for food/nutrient provision.   Outcome: Not Met This Shift

## 2019-03-21 NOTE — DISCHARGE INSTR - DIET

## 2019-03-21 NOTE — CONSULTS
[address] [place] [address]     Patient: Rj Sheth   : 1950  MRN: [id number]    Date of Consultation: 3/21/2019  Date of Service: 3/21/2019    Consultation requested by Dr. Bree Caraballo    Reason for Consultation: Disc Herniation     History of Present Illness: This is a 79year old  male who presented to the ED for acute onset midline LBP beginning yesterday morning while getting out of bed. PMHx significant for AR, MR, HTN, HLD. Patient states the pain is sharp, stabbing, 10/10, and constant. Denies LE pain, weakness, n/t. No loss of bowel or bladder function. Denies trying/needing to try PT or Pain Management for FLORIN/nerve blocks. Denies pervious spine surgery. Family present with generalized questions. Allergies:   Patient has no known allergies. Past Medical History:      Diagnosis Date    Aortic insufficiency     Aortic regurgitation     Hyperlipidemia     Hypertension     Mitral regurgitation     Prostate [facility])        Surgical History:      Procedure Laterality Date    ABLATION OF DYSRHYTHMIC FOCUS  2013    SVT ABLATION     CARDIAC CATHETERIZATION  2013    HEMORRHOID SURGERY      HERNIA REPAIR         Social History:   reports that he has never smoked. He has never used smokeless tobacco.   reports that he does not drink alcohol.     Family History:      Problem Relation Age of Onset    Stroke Mother     Heart Attack Father        Review of Systems:  Denies fever, chills, or night sweats  Denies headache, dizziness, syncope  Denies blurred vision, double vision  Denies chest pain, palpitations, SOB  Denies diarrhea, constipation, n/v  Denies dysuria, hematuria  Denies recent infections  Denies easy bruising  Denies anxiety, depression    Back pain    Physical Exam:  WDWN, resting comfortable, no apparent distress  Appears stated age  Vitals stable  Non-labored breathing   A&O x 3, normal affect   Head is normocephalic, atraumatic No palpable lymphadenopathy   Abdomen soft, nontender  Pupils equal and reactive, no scleral icterus  EOMI bilaterally  Cranial nerves II-XII intact bilaterally  No drift  5/5 in BUE  5/5 in BLE  Sensation to LT intact x 4 ext  Toes going down  Skin warm and dry  +Midline tenderness to lumbar palpation  Mild right SI joint tenderness    Review of Imaging:  CT Lumbar Spine   Impression       NO ACUTE FRACTURE OR SIGNIFICANT SUBLUXATION IS IDENTIFIED       Multilevel disc disease with herniation as described above. Patient is   amenable for epidural steroid injection of facet joint blocks for pain   management. .      Assessment:   New problem: Patient with acute onset low back pain. Stable. Plan:  -Medical management   -Pain control  -PT/OT  -MRI of lumbar spine pending--will review and make final recommendations         I independently saw, evaluated, and examined the patient. Agree with plan outlined above. I independently reviewed the patient's imaging and laboratory results. Jurgen West is 79years old. He has history of aortic insufficiency, aortic regurgitation, hyperlipidemia, hypertension, mitral regurgitation, history of prostate cancer, history of hernia repair. He presents with lower back pain. The pain started today. He describes it as an achy pain. He denies any associated pain radiating down his legs. He denies any new weakness or numbness. CT scan of the lumbar spine does not demonstrate any fracture. There is loss of lumbar lordosis. There is a L3-4 disc osteophyte. There is autofusion of the SI joints. Sodium 139. Hemoglobin 10.2. Platelets 342. MRI of the lumbar spine is pending.  Electronically signed by Mitchel Bourgeois MD on 3/21/2019 at 5:45 PM

## 2019-03-21 NOTE — ED PROVIDER NOTES
Department of Emergency Medicine   ED  Provider Note  Admit Date/RoomTime: 3/20/2019  8:41 PM  ED Room: 22/22          History of Present Illness:  3/20/19, Time: 9:08 PM         Betty Hudson is a 79 y.o. male presenting to the ED for back pain, beginning this morning. The complaint has been persistent, moderate in severity, and worsened by nothing. Pt arrived via EMS. The pt reports having achy pain to his low mid back. Pain is non radiating. No injury or fall. Per pt, he has not had similar pain before. Pt denies any focal weakness, numbness/tingling, fever, chest pain, sob, abdominal pain, nausea, emesis, diarrhea, urinary sx, or any further complaints. There is no incontinence    Review of Systems:   Pertinent positives and negatives are stated within HPI, all other systems reviewed and are negative.      --------------------------------------------- PAST HISTORY ---------------------------------------------  Past Medical History:  has a past medical history of Aortic insufficiency, Aortic regurgitation, Hyperlipidemia, Hypertension, Mitral regurgitation, and Prostate CA ([facility] [address] Past Surgical History:  has a past surgical history that includes hernia repair; Cardiac catheterization (4/2013); ablation of dysrhythmic focus (05/29/2013); and Hemorrhoid surgery. Social History:  reports that he has never smoked. He has never used smokeless tobacco. He reports that he does not drink alcohol or use drugs. Family History: family history includes Heart Attack in his father; Stroke in his mother. The patients home medications have been reviewed. Allergies: Patient has no known allergies.       ---------------------------------------------------PHYSICAL EXAM--------------------------------------    Constitutional/General: Alert and oriented x3, Mild distress  Head: Normocephalic and atraumatic  Eyes: PERRL, EOMI, conjunctiva normal  Mouth: Oropharynx clear, handling secretions, no asymmetry of the posterior oropharynx or uvular edema  Neck: Supple, full ROM, non tender to palpation in the midline, no stridor, no crepitus, no meningeal signs  Respiratory: Lungs clear to auscultation bilaterally, no wheezes, rales, or rhonchi. Not in respiratory distress  Cardiovascular:  Regular rate. Regular rhythm. No murmurs, gallops, or rubs. 2+ distal pulses  GI:  Abdomen Soft, Non tender, Non distended. +BS. No rebound, guarding, or rigidity. No pulsatile masses. Musculoskeletal: Moves all extremities x 4. Warm and well perfused. Back - Midline lumbar tenderness. Integument: skin warm and dry. No rashes. Neurologic: GCS 15, no focal deficits, symmetric strength in the upper and lower extremities bilaterally  Psychiatric: Normal Affect      -------------------------------------------------- RESULTS -------------------------------------------------  I have personally reviewed all laboratory and imaging results for this patient. Results are listed below.      LABS:  Results for orders placed or performed during the hospital encounter of 03/20/19   CBC   Result Value Ref Range    WBC 11.6 (H) 4.5 - 11.5 E9/L    RBC 3.70 (L) 3.80 - 5.80 E12/L    Hemoglobin 10.2 (L) 12.5 - 16.5 g/dL    Hematocrit 31.7 (L) 37.0 - 54.0 %    MCV 85.7 80.0 - 99.9 fL    MCH 27.6 26.0 - 35.0 pg    MCHC 32.2 32.0 - 34.5 %    RDW 14.6 11.5 - 15.0 fL    Platelets 887 279 - 341 E9/L    MPV 9.7 7.0 - 12.0 fL   Basic Metabolic Panel   Result Value Ref Range    Sodium 135 132 - 146 mmol/L    Potassium 4.3 3.5 - 5.0 mmol/L    Chloride 98 98 - 107 mmol/L    CO2 25 22 - 29 mmol/L    Anion Gap 12 7 - 16 mmol/L    Glucose 175 (H) 74 - 99 mg/dL    BUN 14 8 - 23 mg/dL    CREATININE 0.9 0.7 - 1.2 mg/dL    GFR Non-African American >60 >=60 mL/min/1.73    GFR African American >60     Calcium 8.9 8.6 - 10.2 mg/dL       RADIOLOGY:  Interpreted by Radiologist.  CT Lumbar Spine WO Contrast   Final Result      NO ACUTE FRACTURE OR SIGNIFICANT SUBLUXATION IS IDENTIFIED      Multilevel disc disease with herniation as described above. Patient is   amenable for epidural steroid injection of facet joint blocks for pain   management. Joe Colby ------------------------- NURSING NOTES AND VITALS REVIEWED ---------------------------   The nursing notes within the ED encounter and vital signs as below have been reviewed by myself. /74   Pulse 96   Temp 98.4 °F (36.9 °C) (Oral)   Resp 14   Ht 5' 8\" (1.727 m)   Wt 180 lb (81.6 kg)   SpO2 98%   BMI 27.37 kg/m²   Oxygen Saturation Interpretation: Normal    The patients available past medical records and past encounters were reviewed. ------------------------------ ED COURSE/MEDICAL DECISION MAKING----------------------  Medications   orphenadrine (NORFLEX) injection 60 mg (60 mg Intramuscular Given 3/20/19 2046)   oxyCODONE-acetaminophen (PERCOCET) 5-325 MG per tablet 1 tablet (1 tablet Oral Given 3/20/19 2146)   ketorolac (TORADOL) injection 30 mg (30 mg Intramuscular Given 3/20/19 2247)   morphine (PF) injection 2 mg (2 mg Intravenous Given 3/21/19 0126)   dexamethasone (DECADRON) injection 10 mg (10 mg Intravenous Given 3/21/19 0127)   morphine sulfate (PF) injection 4 mg (4 mg Intravenous Given 3/21/19 0325)            Medical Decision Making:    Pt arrived via EMS for low back pain. Pt evaluated. Medication given. Imaging and labs obtained and reviewed. Results discussed with pt and wife. This patient's ED course included: a personal history and physicial examination and re-evaluation prior to disposition    This patient has remained hemodynamically stable during their ED course. Re-Evaluations:           Re-evaluation. Patient rechecked and mildly improved.  Patient made aware of findings and need for follow  patient plan will be to ambulate and if improved patient will be discharge   was rechecked multiple times and medicated and still having pain patient unable to stand or

## 2019-03-21 NOTE — PROGRESS NOTES
Nutrition Assessment    Type and Reason for Visit: Initial, Positive Nutrition Screen    Nutrition Recommendations: Continue current diet, Start ONS, Ensure clear daily    Nutrition Assessment: Pt mildly malnourished on admission, decreased po intake and weight loss secondary to back pain. Malnutrition Assessment:  · Malnutrition Status: Mild Malnutrition  · Context: Acute illness or injury  · Findings of the 6 clinical characteristics of malnutrition (Minimum of 2 out of 6 clinical characteristics is required to make the diagnosis of moderate or severe Protein Calorie Malnutrition based on AND/ASPEN Guidelines):  1. Energy Intake-Less than or equal to 50% of estimated energy requirement, Greater than or equal to 5 days    2. Weight Loss-10% loss or greater, (9 months)  3. Fat Loss-Mild subcutaneous fat loss, Orbital  4. Muscle Loss-No significant muscle mass loss,    5. Fluid Accumulation-No significant fluid accumulation,    6.  Strength-Not measured    Nutrition Risk Level: Moderate    Nutrient Needs:  · Estimated Daily Total Kcal: 1800- 2100(CBW 25-30 kcals/kg)  · Estimated Daily Protein (g): (CBW 1.2-1.4 g/kg)  · Estimated Daily Total Fluid (ml/day): 3229-0628(1 ml/kcal)    Nutrition Diagnosis:   · Problem:  In context of acute illness or injury(Mild malnutrition)  · Etiology: related to Insufficient energy/nutrient consumption     Signs and symptoms:  as evidenced by Diet history of poor intake, Weight loss, Mild loss of subcutaneous fat(14% weight loss x 9 months)    Objective Information:  · Nutrition-Focused Physical Findings: alert, wife at bed side, poor po pta, weakness,  unplanned weight loss, hyperglycemia, abdomen WDL, + BS, no I/O documented, Hx: mitral regurgiation, prostate cancer  · Wound Type: None  · Current Nutrition Therapies:  · Oral Diet Orders: General   · Oral Diet intake: Unable to assess  · Anthropometric Measures:  · Ht: 5' 8\" (172.7 cm)   · Current Body Wt: 161 lb (73 kg)(3/21 bed scale)  · Admission Body Wt: 161 lb (73 kg)(first actual)  · Usual Body Wt: 188 lb (85.3 kg)(6/2018 office visit )  · % Weight Change:  ,  14% weight loss x 9 months  · Ideal Body Wt: 154 lb (69.9 kg), % Ideal Body 105%  · BMI Classification: BMI 18.5 - 24.9 Normal Weight    Nutrition Interventions:   Continue current diet, Start ONS(Ensure clear daily)  Continued Inpatient Monitoring, Education not appropriate at this time, Coordination of Care    Nutrition Evaluation:   · Evaluation: Goals set   · Goals: Consume > 75% of meals.     · Monitoring: Meal Intake, Diet Tolerance, I&O, Weight, Pertinent Labs, Monitor Hemodynamic Status      Electronically signed by Karis Brenner RD, LD on 3/21/19 at 12:03 PM    Contact Number:  Ext [phone]

## 2019-03-22 NOTE — PLAN OF CARE
I had referred her to a pulmonologist for further eval/tx, please see that she's followed with them and has a plan for f/u (if needed)--I don't see that I have the pulm progress notes Problem: Falls - Risk of:  Goal: Will remain free from falls  Description  Will remain free from falls  Outcome: Met This Shift  Goal: Absence of physical injury  Description  Absence of physical injury  Outcome: Met This Shift     Problem: Pain:  Goal: Pain level will decrease  Description  Pain level will decrease  Outcome: Ongoing

## 2019-03-22 NOTE — PROGRESS NOTES
MG per tablet 1 tablet, 1 tablet, Oral, Q4H PRN  cyclobenzaprine (FLEXERIL) tablet 10 mg, 10 mg, Oral, TID PRN  dexamethasone (DECADRON) injection 4 mg, 4 mg, Intravenous, Q6H  amLODIPine (NORVASC) tablet 5 mg, 5 mg, Oral, Daily  atorvastatin (LIPITOR) tablet 20 mg, 20 mg, Oral, Daily  ferrous sulfate tablet 325 mg, 325 mg, Oral, Daily  lisinopril (PRINIVIL;ZESTRIL) tablet 40 mg, 40 mg, Oral, Daily  sodium chloride flush 0.9 % injection 10 mL, 10 mL, Intravenous, 2 times per day  sodium chloride flush 0.9 % injection 10 mL, 10 mL, Intravenous, PRN  magnesium hydroxide (MILK OF MAGNESIA) 400 MG/5ML suspension 30 mL, 30 mL, Oral, Daily PRN  ondansetron (ZOFRAN) injection 4 mg, 4 mg, Intravenous, Q6H PRN    ASSESSMENT:   New problem: Patient with acute onset low back pain. Stable. Alondra Kruger is 79years old. He has history of aortic insufficiency, aortic regurgitation, hyperlipidemia, hypertension, mitral regurgitation, history of prostate cancer, history of hernia repair. He presents with lower back pain. The pain started today. He describes it as an achy pain. He denies any associated pain radiating down his legs. He denies any new weakness or numbness. CT scan of the lumbar spine does not demonstrate any fracture. There is loss of lumbar lordosis. There is a L3-4 disc osteophyte. There is autofusion of the SI joints. PLAN:  Medical management   -Pain control  -PT/OT  -MRI of lumbar spine pending. If no acute fx, continue PT/OT and referral to pain clinic. Follow up in Neurosurgery clinic if symptoms do not improve with conservative therapy. Electronically signed by ANY Prather on 3/22/2019 at 9:40 AM       I independently saw, evaluated, and examined the patient. Agree with plan outlined above.    Electronically signed by Edenilson Burk MD on 3/22/2019 at 8:02 PM

## 2019-03-23 NOTE — PROGRESS NOTES
Physical Therapy    Initial Assessment     Name: Dieter Treadwell  : 1950  MRN: [id number]    Date of Service: 3/23/2019    Evaluating PT:  Lucía De Los Santos, PT, DPT [id number]    Room #:  3919/4502-X  Diagnosis:  Lumbar disc herniation   PMHx:  Aortic insufficiency, aortic regurgitation, HLD, HTN, mitral regurgitation, prostate CA  Precautions:  Falls, Spinal precautions for comfort   Equipment Needs:  TBD    Pt lives with wife in a 2 story home with 3 stairs to enter and 1 rail(s). Bedroom and bathroom are on the second floor with 8+7 steps and 1 rail to access. Pt and his wife state they are unable to make first floor set up. Pt ambulated with no AD and was independent PTA. Initial Evaluation  Date: 3/23/19 Treatment Short Term/ Long Term   Goals   AM-PAC 6 Clicks 48/65     Was pt agreeable to Eval/treatment? Yes     Does pt have pain? 10/10 LBP     Bed Mobility  Rolling: Min A  Supine to sit: SBA  Sit to supine: NT  Scooting: SBA to EOB  Independnent    Transfers Sit to stand: Min A  Stand to sit: Min A  Stand pivot: Min A with [employer]  Sit to stand: Independent   Stand to sit:  Independent   Stand pivot: Modified Independent     Ambulation    50, 75 feet with [employer] Min A  >200 feet with AAD Modified Independent     Stair negotiation: ascended and descended  NT  >12 steps with 1 rail SBA   ROM BUE:  WFL  BLE:  WFL     Strength BUE:  WFL  BLE:  Grossly 4/5     Balance Sitting EOB:  Independent   Dynamic Standing:  Min A with [employer]  Sitting EOB:  Independent   Dynamic Standing:  Independent      Pt is A & O x 4  Sensation:  Pt denies numbness and tingling to extremities  Edema:  Unremarkable     Patient education  Pt educated on PT role, safety during functional mobility, spinal precautions, log roll for supine>sit, [employer] approximation/negotiation, gait training     Patient response to education:   Pt verbalized understanding Pt demonstrated skill Pt requires further education in this area   Yes Yes Reinforce      Comments: Pt received supine with wife present and agreeable to PT evaluation. Pt limited by severe back pain. Educated on log roll technique for supine>sit. Pt moves extremely slow and requires multiple attempts for mobility. Rolled to side multiple times and would roll back citing too much pain. Eventually able to move supine>sit with increased time. Attempted STS from bed but pt unable. States he needs to move bowels,  Assisted with lateral pivot transfer bed>bedside commode. Pt states he no longer needs to move bowels. STS performed from bedside commode, pt unable to maintain standing longer than a minute and returned to sitting. Second STS performed to [employer], pt again had to sit. Third STS performed and this time pt able to ambulate to hallway. Slow to rise during STS and slow gait speed noted. Some verbal cues for [employer] approximation/negotiation. Pt returned to bedside chair. Verbal cues to scoot out to edge of chair and push from arm rests for next STS. Second bout of ambulation this time pt able to move farther but still limited in distance d/t pain. Pt moves slowly but once up does not require physical assistance to ambulate just standby for safety and verbal cueing of [employer]. Pt returned to sitting in chair. Him and his wife with many questions, all answered. They are concerned with needing to move if he is unable to get up to bedroom. Initially, pt's wife stated they would not be able to modify first floor but then asked if she could get a hospital bed for first floor with bedside commode and urinal.  Explained this would help as pt would only have to ascend 3 steps vs 18. All other questions answered. Pt left up in chair with call button in reach and needs met. Pt would benefit from continued PT at [facility]. Pts/ family goals   1. Home    Patient and or family understand(s) diagnosis, prognosis, and plan of care. Yes     PLAN  PT care will be provided in accordance with the objectives noted above. Whenever appropriate, clear delegation orders will be provided for nursing staff. Exercises and functional mobility practice will be used as well as appropriate assistive devices or modalities to obtain goals. Patient and family education will also be administered as needed.     Frequency of treatments: daily    Time in  1245  Time out  [address], [place]  [id number]

## 2019-03-23 NOTE — PROGRESS NOTES
ferrous sulfate  325 mg Oral Daily    lisinopril  40 mg Oral Daily    sodium chloride flush  10 mL Intravenous 2 times per day     PRN Meds: morphine, oxyCODONE-acetaminophen, cyclobenzaprine, sodium chloride flush, magnesium hydroxide, ondansetron    I/O    Intake/Output Summary (Last 24 hours) at 3/23/2019 1909  Last data filed at 3/23/2019 1500  Gross per 24 hour   Intake 240 ml   Output 1380 ml   Net -1140 ml       Labs:   Recent Labs     03/20/19 2045 03/22/19  0508 03/23/19  0513   WBC 11.6* 14.6* 15.1*   HGB 10.2* 10.0* 10.1*   HCT 31.7* 30.9* 31.3*    356 357       Recent Labs     03/20/19 2045 03/21/19  0637 03/22/19  0508    139 131*   K 4.3 4.2 4.0   CL 98 101 96*   CO2 25 26 25   BUN 14 15 24*   CREATININE 0.9 0.9 0.8   CALCIUM 8.9 9.1 8.5*       No results for input(s): PROT, ALB, ALKPHOS, ALT, AST, BILITOT, AMYLASE, LIPASE in the last 72 hours. Recent Labs     03/21/19  0637   INR 1.4       No results for input(s): Starla Beavers in the last 72 hours. Chronic labs:  Lab Results   Component Value Date    CHOL 141 06/15/2018    TRIG 72 06/15/2018    HDL 65 06/15/2018    LDLCALC 62 06/15/2018    TSH 1.361 03/29/2012    PSA 0.58 09/06/2018    INR 1.4 03/21/2019    LABA1C 6.6 (H) 03/21/2019       Radiology:  Imaging studies reviewed today.     ASSESSMENT:  Lower back pain  HTN  Prediabetes  Severe aortic regurgitation  Jehovah witness    PLAN:  Conservative management  Pain control  Continue decadron  PT/OT for dispo; likely home with home health tomorrow       Diet: DIET GENERAL;  Dietary Nutrition Supplements: Clear Liquid Oral Supplement  Code Status: Full Code  PT/OT Eval Status:   Ordered   DVT Prophylaxis:   SCD  Recommended disposition at discharge:  home with home health     +++++++++++++++++++++++++++++++++++++++++++++++++  Joslyn Coleman MD   [facility].  +++++++++++++++++++++++++++++++++++++++++++++++++  NOTE: This report was transcribed using voice recognition software.  Every effort was made to ensure accuracy; however, inadvertent computerized transcription errors may be present.

## 2019-03-23 NOTE — PROGRESS NOTES
Department of Neurosurgery  Progress Note    CHIEF COMPLAINT: LBP    SUBJECTIVE:  Pt c/o LBP, but slightly improved. No leg pain    REVIEW OF SYSTEMS :  Constitutional: Negative for chills and fever. Neurological: Negative for dizziness, tremors and speech change.      OBJECTIVE:   VITALS:  BP (!) 140/58   Pulse 76   Temp 97.8 °F (36.6 °C)   Resp 15   Ht 5' 8\" (1.727 m)   Wt 161 lb (73 kg)   SpO2 96%   BMI 24.48 kg/m²   PHYSICAL:  resting comfortable, no apparent distress  Non-labored breathing   A&O x 3, normal affect   Head is normocephalic, atraumatic   No palpable lymphadenopathy   Abdomen soft, nontender  Pupils equal and reactive, no scleral icterus  EOMI bilaterally  Cranial nerves II-XII intact bilaterally  No drift  5/5 in BUE  5/5 in BLE  Sensation to LT intact x 4 ext  Toes going down  Skin warm and dry  +Midline tenderness to lumbar palpation      DATA:  CBC:   Lab Results   Component Value Date    WBC 15.1 03/23/2019    RBC 3.65 03/23/2019    HGB 10.1 03/23/2019    HCT 31.3 03/23/2019    MCV 85.8 03/23/2019    MCH 27.7 03/23/2019    MCHC 32.3 03/23/2019    RDW 14.4 03/23/2019     03/23/2019    MPV 9.5 03/23/2019     BMP:    Lab Results   Component Value Date     03/22/2019    K 4.0 03/22/2019    CL 96 03/22/2019    CO2 25 03/22/2019    BUN 24 03/22/2019    LABALBU 3.2 02/27/2019    LABALBU 4.5 03/29/2012    CREATININE 0.8 03/22/2019    CALCIUM 8.5 03/22/2019    GFRAA >60 03/22/2019    LABGLOM >60 03/22/2019    GLUCOSE 176 03/22/2019    GLUCOSE 98 03/29/2012     PT/INR:    Lab Results   Component Value Date    PROTIME 16.3 03/21/2019    INR 1.4 03/21/2019     PTT:  No results found for: APTT, PTT[APTT}    Current Inpatient Medications  Current Facility-Administered Medications: senna (SENOKOT) tablet 8.6 mg, 1 tablet, Oral, BID  pneumococcal 13-valent conjugate (PREVNAR) injection 0.5 mL, 0.5 mL, Intramuscular, Prior to discharge  morphine sulfate (PF) injection 4 mg, 4 mg, Intravenous, Q4H PRN  oxyCODONE-acetaminophen (PERCOCET) 5-325 MG per tablet 1 tablet, 1 tablet, Oral, Q4H PRN  cyclobenzaprine (FLEXERIL) tablet 10 mg, 10 mg, Oral, TID PRN  dexamethasone (DECADRON) injection 4 mg, 4 mg, Intravenous, Q6H  amLODIPine (NORVASC) tablet 5 mg, 5 mg, Oral, Daily  atorvastatin (LIPITOR) tablet 20 mg, 20 mg, Oral, Daily  ferrous sulfate tablet 325 mg, 325 mg, Oral, Daily  lisinopril (PRINIVIL;ZESTRIL) tablet 40 mg, 40 mg, Oral, Daily  sodium chloride flush 0.9 % injection 10 mL, 10 mL, Intravenous, 2 times per day  sodium chloride flush 0.9 % injection 10 mL, 10 mL, Intravenous, PRN  magnesium hydroxide (MILK OF MAGNESIA) 400 MG/5ML suspension 30 mL, 30 mL, Oral, Daily PRN  ondansetron (ZOFRAN) injection 4 mg, 4 mg, Intravenous, Q6H PRN    ASSESSMENT:   New problem: Patient with acute onset low back pain. Stable. Jorgito Francis is 79years old. He has history of aortic insufficiency, aortic regurgitation, hyperlipidemia, hypertension, mitral regurgitation, history of prostate cancer, history of hernia repair. He presents with lower back pain. The pain started today. He describes it as an achy pain. He denies any associated pain radiating down his legs. He denies any new weakness or numbness. CT scan of the lumbar spine does not demonstrate any fracture. There is loss of lumbar lordosis. There is a L3-4 disc osteophyte. There is autofusion of the SI joints. 3/22 Lumbar MRI demonstrates degenerative changes, loss of lumbar lordosis, neuroforaminal stenosis. No fracture. PLAN:  Medical management   -Pain control  -PT/OT  -Continue PT/OT and referral to pain clinic. Follow up in Neurosurgery clinic if symptoms do not improve with conservative therapy. Electronically signed by ANY Tavarez on 3/23/2019 at 8:25 AM       I independently saw, evaluated, and examined the patient. Agree with plan outlined above.    Electronically signed by Ketty Bocanegra MD on You were seen in the ER for cough. You must follow up with your primary physician in 24 to 48 hours. Return to the ER for any new or worsening signs/symptoms.

## 2019-03-24 NOTE — CARE COORDINATION
SOCIAL WORK/CASEMANAGEMENT TRANSITION OF CARE PLANNING: met with pt in the room and a female family member. Pt [facility] only in network with [facility] and [facility] with insurance for [facility] and fww, referrals made and walker to room for discharge today.  Jacob Bella  3/24/2019

## 2019-03-24 NOTE — PROGRESS NOTES
RN paged to room by patient. Patient's wife appears upset stating, \"who left this dirty gown on the end of his bed? This is from his neighbor. Its not even his, that is disgusting and unsanitary\". RN unaware of events that lead to the gown being placed on patient's bed as it was likely done during the previous shift.

## 2019-03-24 NOTE — PROGRESS NOTES
SW notified of discharge and need [place] [address] and DME set up  Electronically signed by Cecilia Romo RN on 3/24/2019 at 12:36 PM

## 2019-03-24 NOTE — PROGRESS NOTES
Occupational Therapy  OCCUPATIONAL THERAPY INITIAL EVALUATION      Date:3/24/2019  Patient Name: Xin Louis  MRN: [id number]  : 1950  Room: [address] [place] [address]    Evaluating OT: Julianna Bay OTR/L    AM-PAC Daily Activity Raw Score:   Recommended Adaptive Equipment: bathroom DME (shower seat versus transfer tub bench; BSC); ww; AE prn. Continue to assess. Comments: Based on patient's functional performance as stated above and level of assistance needed prior to admission, this therapist believes that the patient would benefit from continued skilled OT during/following hospital stay in an effort to increase safety, functional independence, and quality of life. Diagnosis: Lumbar disc herniation    Pertinent Medical History: Aortic insufficiency, aortic regurgitation, HLD, HTN, mitral regurgitation, prostate CA    Precautions:  Falls, spinal precautions for comfort     Home Living:   Pt lives with wife in a 2 story home with 3 stairs to enter and 1 rail(s). Bedroom and bathroom are on the second floor with 8+7 steps and 1 rail to access. Pt does not have bathroom located on 1st floor. Laundry located in basement with full flight and B rails. Bathroom setup: Pt using tub/ shower with no shower seat/grab; standard toilet  Equipment owned: none    Prior Level of Function: Indep with ADLs; Indep with IADLs; using no device for ambulation. Driving: Yes  Occupation: Works part-time at [employer] and spend a lot of time in community. Pain Level: Initially reports 10/10 at bed level; but then states improvement. Appears to have back pain with change of position specifically with STS  Cognition: A&O: 4/4; Follows basic step directions.    Communication: WNL   Memory: F   Sequencing: F   Problem solving: F   Judgement/safety: F  VC for hand placement, feet placement and ww positioning during STS and transfers       Functional Assessment:   Initial Eval Status  Date: 3/24/19 Treatment Status  Date: Short Term Goals  Treatment frequency: 1-3x/week on ICU; PRN on stepdown unit  -pt will improve. .. Feeding Indep        Grooming CGA  Standing at sink with ww    For balance/pain. Setup seated     Mod I   while standing/seated ; G demo EC, pacing for pain mgmt   UB Dressing Setup     Mod I  While seated including clothing retrieval; G demo EC, pacing for pain mgmt   LB Dressing Min/CGA   for dynamic standing balance at ww level; chair level crossing legs to yasmany/doff socks    Mod I   with AE/device PRN   Bathing UB-  Setup  LB-  Min A    UB-  Indep  LB-  Mod I with AE/DME prn   Toileting CGA  For standing balance at ww level     Mod I  including clothing mgmt and toileting hygiene using DME/device prn   Bed Mobility  Supine to sit: SBA  Sit to supine: NT  Rolling: NT   Indep   in prep for EOB ADL tasks   Functional Transfers Sit to stand: SBA  Stand to sit: SBA  Stand pivot: CGA with ww   Mod I  from varying surfaces using device prn   Functional Mobility CGA  Short distances, in and out of bathroom   Mod I   Household distance using device prn   Balance Sitting:     Static:  Indep    Dynamic:Supervision  Standing:     Static:  SBA with ww    Dynamic: Min/CGA with ww  demo Indep dynamic sitting balance EOB during ADL tasks;  Mod I dynamic standing balance during ADL tasks using device prn   Endurance/  Activity Tolerance F activity tolerance/endurance during light ADL task ; standing tolerance ~5 minutes, depending on back pain levels  demo G activity tolerance/endurance during [id number] min ADL task    Visual/  Perceptual Glasses: wears all the time        Safety F  G safety and carryover of EC, pacing for back pain mgmt technique during ADLs and functional mobility      Hand dominance: Right      Strength ROM  Comments   RUE  4+/5 Proximal:  -PROM: WFL   -AROM: WFL  Distal: WFL G   G FMC/dexterity noted during ADL tasks   LUE 4+/5 Proximal:  -PROM: WFL   -AROM: WFL  Distal: WFL G   G FMC/dexterity noted during ADL tasks      Hearing: WNL   Sensation:   Pt denies any numbness or tingling in BUE/BLE. Tone: WNL  Edema: Unremarkable                            Comments/Treatment Narrative: OK from RN to see patient. Thorough chart review and evaluation/tx completed Upon arrival patient supine with HOB slightly elevated, wife present in room. Patient agreeable to session. Pt performed supine to sit EOB with SBA increased time due to low back pain. Multiple attempts required for STS from EOB to ww level with education/VC for proper hand/feet placement, SBA. Pt ambulated short distances in room to/from bathroom, toilet transfer, simulated toileting, LB dressing as stated above. Discussed with pt/wife potential DME needs including BSC/urinal use for 1st floor setup to eliminate going up/down stairs multiple times during the day- as they do not have bathroom located on 1st floor. Good understanding. After session, left sitting up in arm chair with call light in place. Pt required cues and education as noted above for safe facilitation and completion of tasks. During functional activites and ADLs pt educated on proper hand placement, safety technique, sequencing, and energy conservation/pacing/breathing techniques for back pain mgmt. Pt/wife additionally educated on OT POC, OT role, OT d/c plan, importance of completing ADL tasks daily as IND as possible to aide in recovery process, potential DME needs, fall risk precautions, and increasing OOB activity. Good understanding. Eval Complexity:   · Moderate Complexity  · History: Expanded review of medical records and additional review of physical, cognitive, or psychosocial history related to current functional performance  · Exam: 3+ performance deficits  · Assistance/Modification: Min/mod assistance or modifications required to perform tasks. May have comorbidities that affect occupational performance.     Assessment of

## 2019-03-24 NOTE — PROGRESS NOTES
extremities  Edema:  Unremarkable      Patient education  Pt educated on PT role, safety during functional mobility, spinal precautions, log roll for supine>sit, [employer] approximation/negotiation, gait training      Patient response to education:   Pt verbalized understanding Pt demonstrated skill Pt requires further education in this area   Yes Yes Reinforce       Comments:  Pt received supine with wife present and agreeable to PT treatment this date. Patient states his LBP has improved since previous date and he is able to function better with use of fww. Educated again on log roll technique for supine>sit with fair carry over. Pt continues to moves extremely slow and requires multiple attempts for mobility. Required 2 attempts to complete Sit to stand. Once on feet able to ambulate to steps and complete then with cues for hand placements. He then ambulated back to his room where he finished ADL assessment with OT staff. His wife observed entire therapy session. Can benefit from fww for home use. Also educated patient on need to complete ambulation at home periodically. Patient eft up in chair with call button in reach and needs met. Pt would benefit from continued PT at [facility].      Pts/ family goals     1. Home     Patient and or family understand(s) diagnosis, prognosis, and plan of care. Yes      PLAN    PT care will be provided in accordance with the objectives noted above. Whenever appropriate, clear delegation orders will be provided for nursing staff. Exercises and functional mobility practice will be used as well as appropriate assistive devices or modalities to obtain goals.  Patient and family education will also be administered as needed.     Frequency of treatments: daily     Time in  0900  Time out  [id number]

## 2019-03-31 NOTE — PROGRESS NOTES
Department of [facility] Oncology  Attending Consult Note    Reason for Visit: Consultation on a patient with Anemia    Referring Physician: Blanca Bermudez MD    PCP:  Jazmín Rice DO    History of Present Illness: On 03/24/2019:  Hb 10.0 Hct 31.01 MCV 85.9 WBC 14.2 ANC 13.77         Review of Systems;  CONSTITUTIONAL: No fever, chills. Good appetite and energy level. ENMT: Eyes: No diplopia; Nose: No epistaxis. Mouth: No sore throat. RESPIRATORY: No hemoptysis, shortness of breath, cough. CARDIOVASCULAR: No chest pain, palpitations. GASTROINTESTINAL: No nausea/vomiting, abdominal pain, diarrhea/constipation. GENITOURINARY: No dysuria, urinary frequency, hematuria. NEURO: No syncope, presyncope, headache. Remainder:  ROS NEGATIVE    Past Medical History:      Diagnosis Date    Aortic insufficiency     Aortic regurgitation     Hyperlipidemia     Hypertension     Mitral regurgitation     Prostate [facility])      Past Surgical History:      Procedure Laterality Date    ABLATION OF DYSRHYTHMIC FOCUS  05/29/2013    SVT ABLATION     CARDIAC CATHETERIZATION  4/2013    CARDIAC CATHETERIZATION  03/28/2019    Dr Uzma Solis      TRANSESOPHAGEAL ECHOCARDIOGRAM  03/28/2019    DR. Cordova     Family History:  Family History   Problem Relation Age of Onset    Stroke Mother     Heart Attack Father      Medications:  Reviewed and reconciled.     Social History:  Social History     Socioeconomic History    Marital status:      Spouse name: Not on file    Number of children: Not on file    Years of education: Not on file    Highest education level: Not on file   Occupational History    Not on file   Social Needs    Financial resource strain: Not on file    Food insecurity:     Worry: Not on file     Inability: Not on file    Transportation needs:     Medical: Not on file     Non-medical: Not on file   Tobacco Use    Smoking status: Never Smoker    Smokeless tobacco: Never Used   Substance and Sexual Activity    Alcohol use: No    Drug use: No    Sexual activity: Not on file   Lifestyle    Physical activity:     Days per week: Not on file     Minutes per session: Not on file    Stress: Not on file   Relationships    Social connections:     Talks on phone: Not on file     Gets together: Not on file     Attends Tenriism service: Not on file     Active member of club or organization: Not on file     Attends meetings of clubs or organizations: Not on file     Relationship status: Not on file    Intimate partner violence:     Fear of current or ex partner: Not on file     Emotionally abused: Not on file     Physically abused: Not on file     Forced sexual activity: Not on file   Other Topics Concern    Not on file   Social History Narrative    Not on file     Allergies:  No Known Allergies    Physical Exam:  There were no vitals taken for this visit. GENERAL: Alert, oriented x 3, not in acute distress. HEENT: PERRLA; EOMI. Oropharynx clear. NECK: Supple. Without lymphadenopathy. LUNGS: Good air entry bilaterally. No wheezing, crackles or ronchi. CARDIOVASCULAR: Regular rate. No murmurs, rubs or gallops. ABDOMEN: Soft. Non-tender, non-distended. Positive bowel sounds. EXTREMITIES: Without clubbing, cyanosis, or edema. NEUROLOGIC: No focal deficits. ECOG PS 1    Impression/Plan: Thank you for allowing us to participate in the care of Mr. Tyrone Carter.      Lawyer Cristobal MD   [id number]  Board Certified Medical Oncologist   Board Certified Internal Medicine

## 2019-04-01 NOTE — TELEPHONE ENCOUNTER
Called patient to inform him of the appointment at [facility]. April 10, 2019 at 1030 am.  Gave the office number to wife for directions and if appointment needs changed.   [phone]

## 2019-04-01 NOTE — PROGRESS NOTES
Lenward Shock  1950 79 y.o. Referring Physician: Dr. Musa Galvan, DO :    Vitals:    04/01/19 1118   BP: (!) 148/65   Pulse: 115   Resp: 18   Temp: 96.4 °F (35.8 °C)    :     :    Body mass index is 24.48 kg/m². Chief Complaint: Patient states referred d/t hgb 10 and pt states the hgb has to be 13 for procedure, anemia. Patient states he has pain in his spine. Chief Complaint   Patient presents with    New Patient       Cancer Staging  No matching staging information was found for the patient. Prior Radiation Therapy? yes   If yes, site treated:  Prostate Facility:       [facility]                      Date:  2012    Concurrent Chemo/radiation? No   If yes, start date:    Prior Chemotherapy? No   If yes, site treated:   Facility:                             Date:    Prior Hormonal Therapy?   No   If yes, site treated:   Facility:                             Date:                  Current Outpatient Medications:     naproxen (NAPROSYN) 500 MG tablet, Take 1 tablet by mouth 2 times daily for 8 days, Disp: 15 tablet, Rfl: 0    senna (SENOKOT) 8.6 MG tablet, Take 1 tablet by mouth 2 times daily for 15 days, Disp: 30 tablet, Rfl: 0    Ferrous Sulfate (IRON) 325 (65 Fe) MG TABS, Take by mouth daily, Disp: , Rfl:     amLODIPine (NORVASC) 5 MG tablet, TAKE 1 TABLET BY MOUTH EVERY DAY, Disp: 90 tablet, Rfl: 0    atorvastatin (LIPITOR) 20 MG tablet, TAKE 1 TABLET BY MOUTH EVERY DAY, Disp: 90 tablet, Rfl: 3    lisinopril (PRINIVIL;ZESTRIL) 40 MG tablet, TAKE 1 TABLET BY MOUTH EVERY DAY, Disp: 30 tablet, Rfl: 11    Psyllium (METAMUCIL FREE & NATURAL) 43 % POWD, Take by mouth, Disp: , Rfl:     Multiple Vitamins-Minerals (ONE-A-DAY 50 PLUS PO), Take  by mouth., Disp: , Rfl:     Omega-3 Fatty Acids (FISH OIL) 1000 MG CAPS, Take 2,000 mg by mouth daily , Disp: , Rfl:        Past Medical History:   Diagnosis Date    Aortic insufficiency     Aortic regurgitation     Hyperlipidemia     history of hypertension, hyperlipidemia, mitral regurgitation, aortic insufficiency, prostate cancer and anemia. Pacemaker/Defibulator/ICD:  No    Mediport: No             FALLS RISK SCREEN  Instructions:  Assess the patient and [place] the appropriate indicators that are present for fall risk identification. Total the numbers circled and assign a fall risk score from Table 2.  Reassess patient at a minimum every 12 weeks or with status change. Assessment   Date  4/1/2019   1. Mental Ability: confusion/cognitively impaired    2. Elimination Issues: incontinence, frequency    3. Ambulatory: use of assistive devices (walker, cane, off-loading devices), attached to equipment (IV pole, oxygen) 2   4. Sensory Limitations: dizziness, vertigo, impaired vision    5. Age less than 72    6. Age 72 or greater 1   7. Medication: diuretics, strong analgesics, hypnotics, sedatives, antihypertensive agents 3   8. Falls:  recent history of falls within the last 3 months (not to include slipping or tripping)    TOTAL 6           TABLE 2   Risk Score Risk Level Plan of Care   0-3 Little or  No Risk 1. Provide assistance as indicated for ambulation activities  2. Reorient confused/cognitively impaired patient  3. Call-light/bell within patient's reach  4. Chair/bed in low position, stretcher/bed with siderails up except when performing patient care activities  5. Educate patient/family/caregiver on falls prevention  6.  Reassess in 12 weeks or with any noted change in patient condition which places them at a risk for a fall   4-6 Moderate Risk 1. Provide assistance as indicated for ambulation activities  2. Reorient confused/cognitively impaired patient  3. Call-light/bell within patient's reach  4. Chair/bed in low position, stretcher/bed with siderails up except when performing patient care activities  5. Educate patient/family/caregiver on falls prevention  6.   Falls risk precaution (Yellow sticker Level II) placed on patient chart   7 or   Higher High Risk 1. Place patient in easily observable treatment room  2. Patient attended at all times by family member or staff  3. Provide assistance as indicated for ambulation activities  4. Reorient confused/cognitively impaired patient  5. Call-light/bell within patient's reach  6. Chair/bed in low position, stretcher/bed with siderails up except when performing patient care activities  7. Educate patient/family/caregiver on falls prevention  8. Falls risk precaution (Yellow sticker Level III) placed on patient chart         NUTRITION RISK SCREEN  Instructions:  Assess the patient and enter the appropriate indicators that are present for nutrition risk identification. Total the numbers entered and assign a risk score. Follow the steps below according to the total score. Assessment   Date  4/1/2019     1. Have you lost weight without trying?                                   a. No (0)                       b. Unsure (2)                                     c.Yes- If yes, how much weight (kg)                                                  have you lost?                                         a. 0.5-5.0(1)                                  b. >5.0-10.0 (2)                                  c. >10.0-15.0 (3)                                  D. >15.0-20.0 (4)    2. Have you been eating poorly because of a decreased appetite? No (0)                                        Yes (1)      3. Do you have a diagnosis of head and neck cancer? A. Yes (1)                                                                                 B. No (0)    TOTAL 0   4. If score 0 or 1 not at risk of malnutrition                  >/=2 at risk of malnutrition, see below           Score of 0-1: No action  Score 2 or greater:  1.  For Non-Diabetic Patient: Recommend adding Ensure Complete 2xdaily and provide              patient with Ensure

## 2019-04-01 NOTE — PROGRESS NOTES
Department of [facility]  Attending Consult Note    Reason for Visit: Consultation on a patient with Normocytic Anemia    Referring Physician: Oh Avelar MD    PCP:  Carlos Adames DO    History of Present Illness:  80 y/o male with hx of HTN, Hyperlipidemia, aortic insufficiency, mitral regurgitation, prostate cancer (2011 s/p RT by Dr. Eduardo Andres) who was noted to have normocytic anemia on routine blood work. On 03/24/2019:  Hb 10.0 Hct 31.01 MCV 85.9 WBC 14.2 ANC 13.77   BUN 24 Creat 0.8  Ca 8.5  On FeSO4 325 mg po daily with fair tolerance. Last EGD/Colonoscopy was more than 5 years ago. No active bleeding noted. Referred to our clinic for further evaluation. Review of Systems;  CONSTITUTIONAL: No fever, chills. Fair appetite. Positive for fatigue. ENMT: Eyes: No diplopia; Nose: No epistaxis. Mouth: No sore throat. RESPIRATORY: No hemoptysis, shortness of breath, cough. CARDIOVASCULAR: No chest pain, palpitations. GASTROINTESTINAL: No nausea/vomiting, abdominal pain, diarrhea/constipation. No melena or hematochezia  GENITOURINARY: No dysuria, urinary frequency, hematuria. NEURO: No syncope, presyncope, headache. Remainder:  ROS NEGATIVE    Past Medical History:      Diagnosis Date    Aortic insufficiency     Aortic regurgitation     Hyperlipidemia     Hypertension     Mitral regurgitation     Prostate CA [facility])      Past Surgical History:      Procedure Laterality Date    ABLATION OF DYSRHYTHMIC FOCUS  05/29/2013    SVT ABLATION     CARDIAC CATHETERIZATION  4/2013    CARDIAC CATHETERIZATION  03/28/2019    Dr Mellissa Zendejas      TRANSESOPHAGEAL ECHOCARDIOGRAM  03/28/2019    DR. Cordova     Family History:  Family History   Problem Relation Age of Onset    Stroke Mother     Heart Attack Father      Medications:  Reviewed and reconciled.     Social History:  Social History     Socioeconomic History    Marital status:      Spouse name: Not on file    Number of children: Not on file    Years of education: Not on file    Highest education level: Not on file   Occupational History    Not on file   Social Needs    Financial resource strain: Not on file    Food insecurity:     Worry: Not on file     Inability: Not on file    Transportation needs:     Medical: Not on file     Non-medical: Not on file   Tobacco Use    Smoking status: Never Smoker    Smokeless tobacco: Never Used   Substance and Sexual Activity    Alcohol use: No    Drug use: No    Sexual activity: Not on file   Lifestyle    Physical activity:     Days per week: Not on file     Minutes per session: Not on file    Stress: Not on file   Relationships    Social connections:     Talks on phone: Not on file     Gets together: Not on file     Attends Adventism service: Not on file     Active member of club or organization: Not on file     Attends meetings of clubs or organizations: Not on file     Relationship status: Not on file    Intimate partner violence:     Fear of current or ex partner: Not on file     Emotionally abused: Not on file     Physically abused: Not on file     Forced sexual activity: Not on file   Other Topics Concern    Not on file   Social History Narrative    Not on file     Allergies:  No Known Allergies    Physical Exam:  BP (!) 148/65 (Site: Left Upper Arm, Position: Sitting, Cuff Size: Medium Adult)   Pulse 115   Temp 96.4 °F (35.8 °C) (Temporal)   Resp 18   Ht 5' 8\" (1.727 m)   BMI 24.48 kg/m²   GENERAL: Alert, oriented x 3, tired  HEENT: PERRLA; EOMI. Oropharynx clear. NECK: Supple. Without lymphadenopathy. LUNGS: Good air entry bilaterally. No wheezing, crackles or ronchi. CARDIOVASCULAR: Regular rate. No murmurs, rubs or gallops. ABDOMEN: Soft. Non-tender, non-distended. Positive BS  EXTREMITIES: Without clubbing, cyanosis, or edema. NEUROLOGIC: No focal deficits.      Impression/Plan:  78 y/o male with hx of HTN, Hyperlipidemia, aortic insufficiency, mitral regurgitation, prostate cancer (2011 s/p RT by Dr. Pippa Feldman) who was noted to have normocytic anemia on routine blood work. On 03/24/2019:  Hb 10.0 Hct 31.01 MCV 85.9 WBC 14.2 ANC 13.77   BUN 24 Creat 0.8  Ca 8.5  On FeSO4 325 mg po daily with fair tolerance. Last EGD/Colonoscopy was more than 5 years ago. No active bleeding noted. Extensive blood work ordered to evaluate his normocytic anemia. GI team consulted for EGD/Colonoscopy. RTC 2-3 weeks to review test results. Thank you for allowing us to participate in the care of Mr. George Kowalski.      Ramin Valdes MD   [id number]  Board Certified Medical Oncologist   Board Certified Internal Medicine

## 2019-04-02 NOTE — PROGRESS NOTES
impingement.       L3-4: Mild central spinal canal stenosis due to posterior disc and   osteophyte complex, ligamenta flava hypertrophy and degenerative facet   arthropathy. Moderate right and mild left foraminal stenosis due to   loss of disc height, prominent posterior lateral component of disc and   ossified complex and degenerative facet uropathy.       L4-5: No central canal stenosis. Mild right and severe left foraminal   stenosis due to loss of disc height, prominent posterior lateral   component of disc and osteophyte complex and degenerative facet   arthropathy. There is mass effect on the left L4 nerve root as it   exits the foramen.       L5-S1: No central canal stenosis. No foraminal stenosis or neural   effacement. CT lumbar 3/2019 -   Findings: The study demonstrates there is normal vertebral body   heights and alignment.        Finding at each of the intervertebral disc space level are as follows:           T12-L1: No significant disc bulge or herniation. No spinal canal   stenosis or foraminal narrowing.        L1-L2: There is a 4 mm paracentral disc herniation which is causing   moderate spinal canal stenosis. There is mild bilateral foraminal   narrowing.        L2-L3: There is a 3.5 mm paracentral disc herniation which is causing   a moderate spinal canal stenosis with bilateral facet joint arthrosis. There is bilateral neuroforaminal narrowing. .        L3-L4: There is a 3.5 mm paracentral disc osteophyte complex   herniation which is causing moderate spinal canal stenosis with   bilateral facet joint arthrosis. There is bilateral neuroforaminal   narrowing. .        L4-L5: There is a 3.5 mm disc osteophyte complex herniation which is   causing moderate spinal canal stenosis with bilateral neuroforaminal   narrowing with bilateral facet joint arthrosis.         L5-S1: There is a 3 mm paracentral disc herniation which is causing   moderate spinal canal stenosis.  There is mild bilateral facet arthrosis. There is no evidence of foraminal narrowing. .        Paravertebral soft tissues are within normal limits. Previous treatments: medications. Past Medical History:   Diagnosis Date    Aortic insufficiency     Aortic regurgitation     Hyperlipidemia     Hypertension     Mitral regurgitation     [facility]state [facility])        Past Surgical History:   Procedure Laterality Date    ABLATION OF DYSRHYTHMIC FOCUS  05/29/2013    SVT ABLATION     CARDIAC CATHETERIZATION  4/2013    CARDIAC CATHETERIZATION  03/28/2019    Dr Starla Carbajal      TRANSESOPHAGEAL ECHOCARDIOGRAM  03/28/2019    DR. Cordova       Prior to Admission medications    Medication Sig Start Date End Date Taking? Authorizing Provider   Ferrous Sulfate (IRON) 325 (65 Fe) MG TABS Take 325 mg by mouth daily    Yes Historical Provider, MD   amLODIPine (NORVASC) 5 MG tablet TAKE 1 TABLET BY MOUTH EVERY DAY 2/13/19  Yes Andriy Palacios DO   atorvastatin (LIPITOR) 20 MG tablet TAKE 1 TABLET BY MOUTH EVERY DAY 7/16/18  Yes Andriy Palacios DO   lisinopril (PRINIVIL;ZESTRIL) 40 MG tablet TAKE 1 TABLET BY MOUTH EVERY DAY 4/6/18  Yes Andriy Palacios DO   Psyllium (METAMUCIL FREE & NATURAL) 43 % POWD Take by mouth   Yes Historical Provider, MD   Multiple Vitamins-Minerals (ONE-A-DAY 50 PLUS PO) Take  by mouth.    Yes Historical Provider, MD   Omega-3 Fatty Acids (FISH OIL) 1000 MG CAPS Take 2,000 mg by mouth daily    Yes Historical Provider, MD       No Known Allergies    Social History     Socioeconomic History    Marital status:      Spouse name: Not on file    Number of children: Not on file    Years of education: Not on file    Highest education level: Not on file   Occupational History    Not on file   Social Needs    Financial resource strain: Not on file    Food insecurity:     Worry: Not on file     Inability: Not on file    Transportation needs:     Medical: Not on file     Non-medical: Not on file   Tobacco Use    Smoking status: Never Smoker    Smokeless tobacco: Never Used   Substance and Sexual Activity    Alcohol use: No    Drug use: No    Sexual activity: Not Currently   Lifestyle    Physical activity:     Days per week: Not on file     Minutes per session: Not on file    Stress: Not on file   Relationships    Social connections:     Talks on phone: Not on file     Gets together: Not on file     Attends Buddhist service: Not on file     Active member of club or organization: Not on file     Attends meetings of clubs or organizations: Not on file     Relationship status: Not on file    Intimate partner violence:     Fear of current or ex partner: Not on file     Emotionally abused: Not on file     Physically abused: Not on file     Forced sexual activity: Not on file   Other Topics Concern    Not on file   Social History Narrative    Not on file       Family History   Problem Relation Age of Onset    Stroke Mother     Heart Attack Father        REVIEW OF SYSTEMS:     Patient specifically denies fever/chills, chest pain, shortness of breath, new bowel or bladder complaints. All other review of systems was negative. PHYSICAL EXAMINATION:      /72   Pulse 84   Temp 98 °F (36.7 °C)   Resp 18   Ht 5' 8\" (1.727 m)   Wt 161 lb (73 kg)   SpO2 93%   BMI 24.48 kg/m²     General:      General appearance:pleasant and well-hydrated, in no distress and A & O x3  Build:Normal Weight  Function:in wheelchair    HEENT:    Head:normocephalic, atraumatic  Pupils:regular, round, equal  Sclera: icterus absent    Lungs:    Breathing:normal breathing pattern    Abdomen:    Shape:non-distended and normal  Tenderness:none  Guarding:none    Lumbar spine:    Spine inspection:rotoscoliosis  CVA tenderness:No   Palpation:tenderness paravertebral muscles, left, right negative.   Range of motion:abnormal moderately in lateral bending, flexion, extension rotation bilateral and is painful. Musculoskeletal:    Trigger points in Paraveteral:absent bilaterally  SI joint tenderness:positive right, positive left              PEGGY test:positive right, positive             left  Piriformis tenderness:negative right, negative left  Trochanteric bursa tenderness:negative right, negative left  SLR:negative right, negative left, sitting     Extremities:    Tremors:None bilaterally upper and lower  Range of motion:pain with internal rotation of hips negative.   Intact:Yes  Varicose veins:absent   Pulses:present Lt radial  Cyanosis:none  Edema:none x all 4 extremities    Neurological:    Sensory:normal to light touch BLE    Motor:                Right Quadriceps5/5          Left Quadriceps5/5           Right Gastrocnemius5/5    Left Gastrocnemius5/5  Right Ant Tibialis5/5  Left Ant Tibialis5/5    Reflexes:    Right Quadriceps reflex2+  Left Quadriceps reflex2+  Right Achilles reflex2+  Left Achilles reflex2+  Gait:in wheelchair    Dermatology:    Skin:no rashes or lesions noted    Assessment/Plan:  Axial LBP that started acutely in March, 2019  Lumbar MRI shows severe rotoscoliosis, L3-4 mild central spinal stenosis and moderate right and mild left foraminal stenosis, L4-5 mild right and severe left foraminal stenosis with mass effect on the left L4 nerve root as it exits the foramen, L5-S1 no central or foraminal stenosis  Hospital notes indicate Dr. Dario Nye noted some autofusion of the b/l SIJ's on the lumbar CT scan  Pt was admitted to the hospital 3/2019 for acute back pain, was evaluated with advanced imaging, treated with pain medications and released  Hx Prostate CA treated with radiation and has been cancer free since that time (gets monitored yearly)  Pt reports significant weight loss (180-161 lbs) that he attributes to inappetence from pain     I am concerned as he reports a red flag symptom of significant weight loss (20 lbs in less than 1 month), onset of his pain being acute in onset without a corresponding lesion on his lumbar imaging, and his report of sudden disability from his pain (he is in a wheelchair due to his symptoms and states his wife has to dress him, when he was working at [email or web address] prior to the onset of pain). He does have a history of prostate cancer which was treated with XRT in 2011 and he has been monitored yearly with urology (Dr. Delta Mcintosh). I instructed he and his wife to get in to the urologist for further w/u ASAP, his wife states she will call today. He is following with hematology in the w/u of normocytic anemia   I did call and speak with Dr. Marianna Soulier regarding this case who will have the patient scheduled to come in to his office for further medical evaluation as well. Reviewed NSGY, hematology, PCP, hospital discharge notes  Reviewed referral documents and imaging  OARRS report reviewed  Pt reports the pain medication given to him from the hospital (percocet) do not help his pain  Suggest cocktail of tylenol and ibuprofen for pain control  B/L SIJ injection ordered - r/b discussed  Patient encouraged to stay active  Treatment plan discussed with the patient including medication and procedure side effects     I spent >60 minutes on this case with >50% of that time spent in face to face contact    CC:  Referring physician    SADAF Levine.

## 2019-04-03 PROBLEM — M53.3 DISORDER OF SACRUM: Status: ACTIVE | Noted: 2019-01-01

## 2019-04-03 PROBLEM — G89.29 CHRONIC BILATERAL LOW BACK PAIN WITHOUT SCIATICA: Status: ACTIVE | Noted: 2019-01-01

## 2019-04-03 PROBLEM — R52 ACUTE PAIN: Status: ACTIVE | Noted: 2019-01-01

## 2019-04-03 PROBLEM — M54.50 CHRONIC BILATERAL LOW BACK PAIN WITHOUT SCIATICA: Status: ACTIVE | Noted: 2019-01-01

## 2019-04-03 NOTE — PROGRESS NOTES
Norma Schulz presents to the [facility] on 4/3/2019. Rolando Dent is complaining of pain in the back . The pain is constant. The pain is described as aching and constant pain that dont go anywhere . Pain is rated on his best day at a 10, on his worst day at a 10, and on average at a 10 on the VAS scale. He took his last dose of was in the hospital was given naproxen and flexeril but didnt come home with them       Any procedures no . Pacemaker or defibrilator: No managed by none.     He has not been on anticoagulation medication  /72   Pulse 84   Temp 98 °F (36.7 °C)   Resp 18   Ht 5' 8\" (1.727 m)   Wt 161 lb (73 kg)   SpO2 93%   BMI 24.48 kg/m²

## 2019-04-16 NOTE — DISCHARGE SUMMARY
Hospital Medicine Discharge Summary    Patient ID: Lenjan Shock      Patient's PCP: Toni Hamilton DO    Admit Date: 3/20/2019     Discharge Date: 3/24/2019      Admitting Physician: Jose Wallace MD     Discharge Physician: Starla Gerard MD     Discharge Diagnoses: Active Hospital Problems    Diagnosis Date Noted    Lumbar disc herniation [M51.26] 03/21/2019    Mild protein-energy malnutrition ([address] [E44.1] 03/21/2019       The patient was seen and examined on day of discharge and this discharge summary is in conjunction with any daily progress note from day of discharge. Hospital Course:   Patient admitted on 3/20/2019 for acute lower back pain. MRI lumbar spine demonstrates severe L3-L4 and L4-L5 DDD. Neurosurgery recs conservative therapy at this time. Pt worked with therapy and was deemed stable for dc home with Feliciano Coronel. Discharged in stable condition on 3/24 to home       Exam:     BP (!) 155/70   Pulse 86   Temp 97.9 °F (36.6 °C) (Temporal)   Resp 18   Ht 5' 8\" (1.727 m)   Wt 161 lb (73 kg)   SpO2 93%   BMI 24.48 kg/m²     General appearance:  No apparent distress, appears stated age and cooperative. HEENT:  Normal cephalic, atraumatic without obvious deformity. Pupils equal, round, and reactive to light.  Extra ocular muscles intact. Conjunctivae/corneas clear. Neck: Supple, with full range of motion. No jugular venous distention. Trachea midline. Respiratory:  Normal respiratory effort. Clear to auscultation, bilaterally without Rales/Wheezes/Rhonchi. Cardiovascular:  Regular rate and rhythm with normal S1/S2 without murmurs, rubs or gallops. Abdomen: Soft, non-tender, non-distended with normal bowel sounds. Musculoskeletal:  No clubbing, cyanosis or edema bilaterally.  Full range of motion without deformity. Skin: Skin color, texture, turgor normal.  No rashes or lesions. Neurologic:  Neurovascularly intact without any focal sensory/motor deficits.   Psychiatric:  Alert and oriented, thought content appropriate, normal insight  Capillary Refill: Brisk,< 3 seconds   Peripheral Pulses: +2 palpable, equal bilaterally           Consults:     IP CONSULT TO INTERNAL MEDICINE  IP CONSULT TO NEUROSURGERY  IP CONSULT TO HOME CARE NEEDS    Significant Diagnostic Studies:   none    Disposition:  Home with Feliciano Coronel     Discharge Instructions/Follow-up:  Keep scheduled follow up appointments. Take medications as prescribed     Code Status:  Prior     Activity: activity as tolerated    Diet: regular diet    Labs: For convenience and continuity at follow-up the following most recent labs are provided:      CBC:    Lab Results   Component Value Date    WBC 19.1 04/01/2019    HGB 10.6 04/01/2019    HCT 32.5 04/01/2019     04/01/2019       Renal:    Lab Results   Component Value Date     04/01/2019    K 4.8 04/01/2019    CL 95 04/01/2019    CO2 27 04/01/2019    BUN 19 04/01/2019    CREATININE 0.9 04/01/2019    CALCIUM 8.8 04/01/2019       Discharge Medications:     Discharge Medication List as of 3/24/2019  1:55 PM           Details   naproxen (NAPROSYN) 500 MG tablet Take 1 tablet by mouth 2 times daily for 8 days, Disp-15 tablet, R-0Normal      oxyCODONE-acetaminophen (PERCOCET) 5-325 MG per tablet Take 1 tablet by mouth every 6 hours as needed for Pain for up to 3 days. , Disp-12 tablet, R-0Normal      senna (SENOKOT) 8.6 MG tablet Take 1 tablet by mouth 2 times daily for 15 days, Disp-30 tablet, R-0Normal      cyclobenzaprine (FLEXERIL) 5 MG tablet Take 1 tablet by mouth 3 times daily as needed for Muscle spasms, Disp-14 tablet, R-0Normal              Details   Ferrous Sulfate (IRON) 325 (65 Fe) MG TABS Take by mouth dailyHistorical Med      amLODIPine (NORVASC) 5 MG tablet TAKE 1 TABLET BY MOUTH EVERY DAY, Disp-90 tablet, R-0Normal      atorvastatin (LIPITOR) 20 MG tablet TAKE 1 TABLET BY MOUTH EVERY DAY, Disp-90 tablet, R-3Normal      lisinopril (PRINIVIL;ZESTRIL) 40 MG tablet TAKE 1 TABLET BY MOUTH EVERY DAY, Disp-30 tablet, R-11, DAWNormal      Psyllium (METAMUCIL FREE & NATURAL) 43 % POWD Take by mouthHistorical Med      Multiple Vitamins-Minerals (ONE-A-DAY 50 PLUS PO) Take  by mouth. Historical Med      Omega-3 Fatty Acids (FISH OIL) 1000 MG CAPS Take 2,000 mg by mouth daily Historical Med             Time Spent on discharge is more than 45 minutes in the examination, evaluation, counseling and review of medications and discharge plan.       Signed:    Jeimy Vergara MD   4/16/2019

## 2019-04-21 PROBLEM — D64.9 CHRONIC ANEMIA: Status: ACTIVE | Noted: 2019-01-01

## 2019-04-21 PROBLEM — Z53.1 REFUSAL OF BLOOD TRANSFUSIONS AS PATIENT IS JEHOVAH'S WITNESS: Status: ACTIVE | Noted: 2019-01-01

## 2019-04-21 PROBLEM — I47.1 SVT (SUPRAVENTRICULAR TACHYCARDIA) (HCC): Status: ACTIVE | Noted: 2019-01-01

## 2019-04-21 PROBLEM — R00.0 TACHYCARDIA: Status: ACTIVE | Noted: 2019-01-01

## 2019-04-21 NOTE — ED PROVIDER NOTES
Department of Emergency Medicine   ED  Provider Note  Admit Date/RoomTime: 4/21/2019  1:56 AM  ED Room: 13/13      History of Present Illness:  4/21/19, Time: 2:07 AM         Arturo Winslow is a 79 y.o. male presenting to the ED for tachycardia, beginning just prior to arrival. The complaint has been persistent, moderate in severity, and worsened by nothing. Pt was given 6 mg and 12 mg adenosine by EMS PTA. Pt notes that since receiving this, he is feeling better. Pt reports that he got up to use the restroom when his tachycardia and palpations began. Hx of mitral and aortic valve regurgitation and hypertension. No hx of MI, stents, or DM. Pt denies LOC, HA, SOB, CP, back pain, neck pain, n/v/d, fever, chills, dizziness, coughing, abdominal pain or any other general complaints. Review of Systems:   Pertinent positives and negatives are stated within HPI, all other systems reviewed and are negative.    --------------------------------------------- PAST HISTORY ---------------------------------------------  Past Medical History:  has a past medical history of Aortic insufficiency, Aortic regurgitation, Hyperlipidemia, Hypertension, Mitral regurgitation, and Prostate CA ([facility] [address] Past Surgical History:  has a past surgical history that includes hernia repair; Cardiac catheterization (4/2013); ablation of dysrhythmic focus (05/29/2013); Hemorrhoid surgery; transesophageal echocardiogram (03/28/2019); and Cardiac catheterization (03/28/2019). Social History:  reports that he has never smoked. He has never used smokeless tobacco. He reports that he does not drink alcohol or use drugs. Family History: family history includes Heart Attack in his father; Stroke in his mother. The patients home medications have been reviewed. Allergies: Patient has no known allergies.     ---------------------------------------------------PHYSICAL EXAM--------------------------------------    Constitutional/General: Alert Metabolic Panel   Result Value Ref Range    Sodium 131 (L) 132 - 146 mmol/L    Potassium 4.0 3.5 - 5.0 mmol/L    Chloride 95 (L) 98 - 107 mmol/L    CO2 22 22 - 29 mmol/L    Anion Gap 14 7 - 16 mmol/L    Glucose 161 (H) 74 - 99 mg/dL    BUN 15 8 - 23 mg/dL    CREATININE 0.8 0.7 - 1.2 mg/dL    GFR Non-African American >60 >=60 mL/min/1.73    GFR African American >60     Calcium 8.7 8.6 - 10.2 mg/dL    Total Protein 6.8 6.4 - 8.3 g/dL    Alb 2.8 (L) 3.5 - 5.2 g/dL    Total Bilirubin 0.8 0.0 - 1.2 mg/dL    Alkaline Phosphatase 132 (H) 40 - 129 U/L    ALT 78 (H) 0 - 40 U/L    AST 45 (H) 0 - 39 U/L   Troponin   Result Value Ref Range    Troponin 0.06 (H) 0.00 - 0.03 ng/mL   Brain Natriuretic Peptide   Result Value Ref Range    Pro-BNP 1,030 (H) 0 - 125 pg/mL   Magnesium   Result Value Ref Range    Magnesium 2.0 1.6 - 2.6 mg/dL       RADIOLOGY:  Interpreted by Radiologist.  XR CHEST PORTABLE    (Results Pending)       EKG: This EKG is signed and interpreted by the EP. Time: 0201  Rate: 123  Rhythm: Sinus  Interpretation: Sinus tachycardia with no obvious ST elevation or depression. No T-wave inversion. Normal intervals. LVH. Comparison: stable as compared to patient's most recent EKG    ------------------------- NURSING NOTES AND VITALS REVIEWED ---------------------------   The nursing notes within the ED encounter and vital signs as below have been reviewed by myself. BP (!) 134/50   Pulse 97   Temp 96.7 °F (35.9 °C) (Temporal)   Resp 20   Ht 5' 11\" (1.803 m)   Wt 161 lb (73 kg)   SpO2 98%   BMI 22.45 kg/m²   Oxygen Saturation Interpretation: Normal    The patients available past medical records and past encounters were reviewed.       ------------------------------ ED COURSE/MEDICAL DECISION MAKING----------------------  Medications   aspirin tablet 325 mg (325 mg Oral Given 4/21/19 0238)   metoprolol (LOPRESSOR) injection 5 mg (5 mg Intravenous Given 4/21/19 0239)       Medical Decision Making: Candis Nascimento Bella De Leon presents with palpitations secondary to tachycardia. EMS reported that the patient was in a faster rate consistent with SVT and administered 6 mg followed by 12 mg of adenosine with improvement of the patient's rate to 120. The patient arrives in sinus tachycardia. He was given a full aspirin as well as 5 mg of Lopressor, which normalized the patient's heart rate. Lab work significant for hyponatremia with a sodium of 131, an acutely elevated BNP of 1030 as well as an elevated troponin of 0.06. The patient's history of aortic insufficiency, we will plan on admitting the patient overnight and observing closely as the patient is at high risk for heart failure and pulmonary edema. Will also plan to trend troponin as it is elevated, likely demand ischemia from tachycardia. Pt reevaluated at 0320 and symptoms improved. Wife at bedside. This patient's ED course included: a personal history and physicial examination and re-evaluation prior to disposition    This patient has remained hemodynamically stable during their ED course. Re-Evaluations:           Re-evaluation. Patients symptoms are improving    Consultations:             Medicine - Spoke with JOANIE, Dr. Emma Biswas, regarding admission at 0335    Counseling: The emergency provider has spoken with the patient and discussed todays results, in addition to providing specific details for the plan of care and counseling regarding the diagnosis and prognosis. Questions are answered at this time and they are agreeable with the plan.     --------------------------------- IMPRESSION AND DISPOSITION ---------------------------------    IMPRESSION  1. Tachycardia    2. Elevated troponin    3. Congestive heart failure, unspecified HF chronicity, unspecified heart failure type ([address]    4. Chronic anemia    5. Aortic valve insufficiency, etiology of cardiac valve disease unspecified    6.  Hyponatremia        DISPOSITION  Disposition: Admit to telemetry  Patient condition is stable    4/21/19, 2:07 AM.    This note is prepared by Attila Steven, acting as Scribe for Robyn Benavides MD.    Robyn Benavides MD:  The scribe's documentation has been prepared under my direction and personally reviewed by me in its entirety. I confirm that the note above accurately reflects all work, treatment, procedures, and medical decision making performed by me.          Robyn Benavides MD  04/21/19 Pura Bonilla MD  04/21/19 2905

## 2019-04-21 NOTE — PROGRESS NOTES
Notified Dr. Erick Parrish of admission and med rec completion.        Mellissa Felipe RN  4/21/2019  4:51 AM

## 2019-04-21 NOTE — PROGRESS NOTES
Dr. Louis Rabago answering service called regarding new consult. Spoke with Dr. Louis Rabago.  He is aware consult

## 2019-04-21 NOTE — H&P
General appearance:  No apparent distress, appears stated age and cooperative. HEENT:  Normal cephalic, atraumatic without obvious deformity. Pupils equal, round, and reactive to light. Extra ocular muscles intact. Conjunctivae/corneas clear. Neck: Supple, with full range of motion. No jugular venous distention. Trachea midline. Respiratory:  Normal respiratory effort. Clear to auscultation, bilaterally without Rales/Wheezes/Rhonchi. Cardiovascular:  Regular rate and rhythm with normal S1/S2 without murmurs, rubs or gallops. Abdomen: Soft, non-tender, non-distended with normal bowel sounds. Musculoskeletal:  No clubbing, cyanosis or edema bilaterally. Full range of motion without deformity. Skin: Skin color, texture, turgor normal.  No rashes or lesions. Neurologic:  Neurovascularly intact without any focal sensory/motor deficits. Psychiatric:  Alert and oriented, thought content appropriate, normal insight  Capillary Refill: Brisk,< 3 seconds   Peripheral Pulses: +2 palpable, equal bilaterally     Xr Chest Portable    Result Date: 2019  Patient MRN: [id number] : 1950 Age:  79 years Gender: Male Order Date: 2019 2:15 AM Exam: XR CHEST PORTABLE Number of Images: 1 view Indication:   chest pain chest pain Comparison: 2019 Findings: The heart is borderline The lung fields demonstrate no significant pulmonary vascular congestion and edema. The aorta is tortuous ectatic and calcified. There are infiltrates throughout the lung fields which are likely chronic     Cardiomegaly Findings compatible with atherosclerotic disease of the aorta.  No acute infiltrate          EKG:     sinus tachycardia rate of 123  Labs:     Recent Labs     19   WBC 14.1*   HGB 9.0*   HCT 28.5*        Recent Labs     19   *   K 4.0   CL 95*   CO2 22   BUN 15   CREATININE 0.8   CALCIUM 8.7     Recent Labs     19   AST 45*   ALT 78*   BILITOT 0.8   ALKPHOS 132*     No results for input(s): INR in the last 72 hours. Recent Labs     04/21/19  0210   TROPONINI 0.06*       Urinalysis:      Lab Results   Component Value Date    NITRU Negative 04/21/2019    WBCUA 0-1 03/21/2019    BACTERIA FEW 03/21/2019    RBCUA NONE 03/21/2019    BLOODU Negative 04/21/2019    SPECGRAV 1.010 04/21/2019    GLUCOSEU Negative 04/21/2019         ASSESSMENT:    Active Hospital Problems    Diagnosis Date Noted    Tachycardia [R00.0] 04/21/2019    Chronic anemia [D64.9] 04/21/2019    Patient is Zoroastrian [Z78.9] 04/21/2019    SVT (supraventricular tachycardia) ([facility]) [I47.1] 04/21/2019    Hypertension [I10]     Prostate CA ([address] Hilda Golds     Aortic valve regurgitation [I35.1]        SVT   - treated with adenosine 6/12 in EMS   - in setting of valvular disease     Slightly elevated troponin   - suspect from elevated heart rate and demand   - will cycle; cardaic cath on 3/29/19 with 0% disease in all vessels     Elevated BNP   Suspect elevated troponin from elevated HR     Severe aortic regurgitation   - following with CTS at valve clinic   -  LIZZY 3/29/19      Hypoalbuminemia     Normocytic anemia   - stable     Leukocytosis     Lumbar disc disease      HTN  - stable      Prediabetes   - no hx of diabetes and no previous abnormal labs on file. Lab Results   Component Value Date    LABA1C 6.6 (H) 03/21/2019     Jehovah witness        PLAN:    Consult cardiology  Cycle troponins  Ice for lumbar back pain as well as Tylenol  Add metoprolol 25 mg b.i.d. Into the home meds as previous  Out of bed for all meals    DVT Prophylaxis: Lovenox  Diet: DIET CARDIAC;  Code Status: Full Code    PT/OT Eval Status:  NA at this time    Dispo - telemetry admission       Janelle Barron, MALLORY - CNP    Thank you Toni Millan DO for the opportunity to be involved in this patient's care.  If you have any questions or concerns please feel free to contact me at [phone]

## 2019-04-21 NOTE — ED NOTES
After SBAR was faxed and transport was requested floor noted that bed assignment was changed from clean to dirty.       Raine Vidal RN  04/21/19 [id number]

## 2019-04-21 NOTE — ED NOTES
Patient now going to [phone]. Called floor and was informed SBAR is coming through now. Fax confirmation sheet in ED. Transport taking patient to floor at this time.       Vipin Bangura RN  04/21/19 0963

## 2019-04-22 PROBLEM — E11.9 TYPE 2 DIABETES MELLITUS, WITHOUT LONG-TERM CURRENT USE OF INSULIN (HCC): Chronic | Status: ACTIVE | Noted: 2019-01-01

## 2019-04-22 PROBLEM — E44.0 MODERATE PROTEIN-ENERGY MALNUTRITION (HCC): Status: ACTIVE | Noted: 2019-01-01

## 2019-04-22 NOTE — PLAN OF CARE
Dr. Jazlyn Villar recommendations noted and I will make arrangements for him to be transferred to either [facility] or [facility] in [place].  Dr. Lance Gagnon

## 2019-04-22 NOTE — PROGRESS NOTES
Hospitalist Progress Note      PCP: Toni Millan DO    Date of Admission: 4/21/2019    Chief Complaint: SVT    Hospital Course: * needs a TAVR however, he  Has a chronic anemia, went for a bleeding scan today, may have to go to [facility] for further evaluation. Subjective: **wife concerned about anemia       Medications:  Reviewed    Infusion Medications    sodium chloride 75 mL/hr at 04/21/19 1416     Scheduled Medications    metoprolol tartrate  50 mg Oral BID    cefTRIAXone (ROCEPHIN) IV  2 g Intravenous Q24H    sodium chloride flush  10 mL Intravenous 2 times per day    enoxaparin  40 mg Subcutaneous Daily    atorvastatin  20 mg Oral Daily    lisinopril  40 mg Oral Daily    psyllium  1 packet Oral Daily     PRN Meds: sodium chloride flush, magnesium hydroxide, ondansetron      Intake/Output Summary (Last 24 hours) at 4/22/2019 1404  Last data filed at 4/22/2019 0620  Gross per 24 hour   Intake 1050 ml   Output 525 ml   Net 525 ml       Exam:    BP (!) 148/62   Pulse 86   Temp 99 °F (37.2 °C) (Temporal)   Resp 16   Ht 5' 9\" (1.753 m)   Wt 144 lb 11.2 oz (65.6 kg)   SpO2 97%   BMI 21.37 kg/m²           Gen: *well developed  HEENT: NC/AT, moist mucous membranes, no oropharyngeal erythema or exudate  Neck: supple, trachea midline, no anterior cervical or SC LAD  Heart:  Normal s1/s2, RRR, no murmurs, gallops, or rubs. Lungs:  cta * bilaterally,   Abd: bowel sounds present, soft, nontender, nondistended, no masses  Extrem:  No clubbing, cyanosis,   Pos  edema  Skin: no rashes or lesions  Psych: A & O x3  Neuro: grossly intact, moves all four extremities.     Capillary Refill: Brisk,< 3 seconds   Peripheral Pulses: +2 palpable, equal bilaterally               Labs:   Recent Labs     04/21/19  0210 04/22/19  0610   WBC 14.1* 12.2*   HGB 9.0* 8.2*   HCT 28.5* 26.0*    342     Recent Labs     04/21/19  0210 04/22/19  0610   * 135   K 4.0 4.1   CL 95* 100   CO2 22 23   BUN 15 13

## 2019-04-22 NOTE — CONSULTS
The [address] [facility] [address]    Name: Jurgen West    Age: 79 y.o. Date of Admission: 4/21/2019  1:56 AM    Date of Service: 4/22/2019    Reason for Consultation: Rapid symptomatic paroxysmal SVT    Referring Physician: Lona Maki    History of Present Illness: The patient is a 79y.o. year old male with medical history as below who developed rapid palpitations and diaphoresis yesterday 4/21. Called 911 personnel and en route to [facility] in ambulance was found to be in rapid SVT. He received intravenous adenosine and converted to sinus rhythm. Currently in sinus rhythm and in no distress. States that he feels better. The patient was admitted to [facility] in March 2019 with severe back pain found due to a lumbar disc herniation. Was given steroids. Also had an evaluation for aortic regurgitation by [id number] [facility] at that time, but my group was not consulted. Had a cardiac catheterization and also had a transesophageal echocardiogram on 3/28 that showed normal left ventricular systolic function with severe aortic regurgitation and diastolic flow reversal in the descending aorta. He apparently was referred to Dr. Vance Kyle for aortic valve replacement consideration at that time but was anemic. He states that he has seen a gastroenterologist as an outpatient and per his description states that he had a colon polyp which was inflamed and which was removed. He is scheduled for a capsule endoscopy on Wednesday, April 24, 2019 and is currently under the care of Dr. Fuentes Flaherty. Has also been seen by Dr. Joel Gipson of hematology/oncology because of anemia and was in fact referred to GI by Dr. Tae Walker. I have not received records of these evaluations and was not informed of them prior to seeing the patient in the hospital today.   He and his wife, who keeps records of his medical visits, informed me of his outpatient GI and hematology evaluation during today's evaluation. Past Medical History:   Hypertension, hyperlipidemia, paroxysmal SVT, non-insulin requiring diabetes mellitus, aortic valvular disease as noted above. Review of Systems:     Constitutional: No fever, chills, sweats  Cardiac: As per HPI  Pulmonary: No cough, wheeze, hemoptysis  HEENT: No visual disturbances or difficult swallowing  GI: No nausea, vomiting, diarrhea, abdominal pain, rectal bleeding  : No dysuria or hematuria  Endocrine: No excessive thirst, heat or cold intolerance. Musculoskeletal: No joint pain or muscle aches.  No claudication  Skin: No skin breakdown or rashes  Neuro: No headache, confusion, or seizures  Psych: No depression, anxiety      Family History:  Family History   Problem Relation Age of Onset    Stroke Mother     Heart Attack Father        Social History:  Social History     Socioeconomic History    Marital status:      Spouse name: Not on file    Number of children: Not on file    Years of education: Not on file    Highest education level: Not on file   Occupational History    Not on file   Social Needs    Financial resource strain: Not on file    Food insecurity:     Worry: Not on file     Inability: Not on file    Transportation needs:     Medical: Not on file     Non-medical: Not on file   Tobacco Use    Smoking status: Never Smoker    Smokeless tobacco: Never Used   Substance and Sexual Activity    Alcohol use: No    Drug use: No    Sexual activity: Not Currently   Lifestyle    Physical activity:     Days per week: Not on file     Minutes per session: Not on file    Stress: Not on file   Relationships    Social connections:     Talks on phone: Not on file     Gets together: Not on file     Attends Sabianism service: Not on file     Active member of club or organization: Not on file     Attends meetings of clubs or organizations: Not on file     Relationship status: Not on file    Intimate partner violence:     Fear of current or ex partner: Not on file     Emotionally abused: Not on file     Physically abused: Not on file     Forced sexual activity: Not on file   Other Topics Concern    Not on file   Social History Narrative    Not on file       Allergies:  No Known Allergies    Current Medications:  Current Facility-Administered Medications   Medication Dose Route Frequency Provider Last Rate Last Dose    sodium chloride flush 0.9 % injection 10 mL  10 mL Intravenous 2 times per day Vincent Sebastian MD   10 mL at 04/21/19 1126    sodium chloride flush 0.9 % injection 10 mL  10 mL Intravenous PRN Vincent Sebastian MD   10 mL at 04/21/19 0755    magnesium hydroxide (MILK OF MAGNESIA) 400 MG/5ML suspension 30 mL  30 mL Oral Daily PRN Vincent Sebastian MD        ondansetron [facility]) injection 4 mg  4 mg Intravenous Q6H PRN Vincent Sebastian MD        enoxaparin (LOVENOX) injection 40 mg  40 mg Subcutaneous Daily Vincent Sebastian MD   40 mg at 04/21/19 0754    metoprolol tartrate (LOPRESSOR) tablet 25 mg  25 mg Oral BID Merlene Rushing, APRN - CNP   25 mg at 04/21/19 2035    atorvastatin (LIPITOR) tablet 20 mg  20 mg Oral Daily Merlene Rushing, APRN - CNP   20 mg at 04/21/19 1125    lisinopril (PRINIVIL;ZESTRIL) tablet 40 mg  40 mg Oral Daily Merlene Rushing, APRN - CNP   40 mg at 04/21/19 1125    psyllium (KONSYL) 28.3 % packet 1 packet  1 packet Oral Daily Merlene Rushing, APRN - CNP   1 packet at 04/21/19 1125    0.9 % sodium chloride infusion   Intravenous Continuous Merlene Rushing APRN - CNP 75 mL/hr at 04/21/19 1416        sodium chloride 75 mL/hr at 04/21/19 1416       Physical Exam:  BP (!) 150/67   Pulse 92   Temp 98 °F (36.7 °C) (Temporal)   Resp 18   Ht 5' 9\" (1.753 m)   Wt 144 lb 11.2 oz (65.6 kg)   SpO2 99%   BMI 21.37 kg/m²   Weight change: -16 lb 4.8 oz (-7.394 kg)  Wt Readings from Last 3 Encounters:   04/22/19 144 lb 11.2 oz (65.6 kg)   04/03/19 161 lb (73 kg)   03/21/19 161 lb (73 kg) General: Awake, alert, oriented x3, no acute distress  HEENT: Unremarkable  Neck: No JVD or bruits. Cardiac: Regular rate and rhythm, 2/6 systolic murmur right and left upper sternal borders. 2/4 diastolic murmur right and left sternal borders. No other extra heart sounds, murmurs, heaves, thrills. Resp: Lungs clear without wheezing or crackles. No accessory muscle use or retraction  Abdomen: soft, nontender, nondistended, no gross organomegaly or mass  Skin: Warm and dry, no cyanosis. Musculoskeletal: normal tone and strength in the upper and lower extremities bilaterally  Neuro: Grossly unremarkable  Psych: Cooperative, and normal affect    Intake/Output:    Intake/Output Summary (Last 24 hours) at 4/22/2019 0624  Last data filed at 4/22/2019 0620  Gross per 24 hour   Intake 1290 ml   Output 725 ml   Net 565 ml     I/O this shift:  In: 670 [P.O.:120; I.V.:550]  Out: 275 [Urine:275]    Laboratory Tests:  Lab Results   Component Value Date    CREATININE 0.8 04/21/2019    BUN 15 04/21/2019     (L) 04/21/2019    K 4.0 04/21/2019    CL 95 (L) 04/21/2019    CO2 22 04/21/2019     No results for input(s): CKTOTAL, CKMB in the last 72 hours.     Invalid input(s): TROPONONI  No results found for: BNP  Lab Results   Component Value Date    WBC 14.1 04/21/2019    RBC 3.39 04/21/2019    HGB 9.0 04/21/2019    HCT 28.5 04/21/2019    MCV 84.1 04/21/2019    MCH 26.5 04/21/2019    MCHC 31.6 04/21/2019    RDW 16.0 04/21/2019     04/21/2019    MPV 9.6 04/21/2019     Recent Labs     04/21/19  0210   ALKPHOS 132*   ALT 78*   AST 45*   PROT 6.8   BILITOT 0.8   LABALBU 2.8*     Lab Results   Component Value Date    MG 2.0 04/21/2019     Lab Results   Component Value Date    PROTIME 16.3 03/21/2019    INR 1.4 03/21/2019     Lab Results   Component Value Date    TSH 3.030 04/01/2019     No components found for: CHLPL  Lab Results   Component Value Date    TRIG 72 06/15/2018    TRIG 41 05/05/2016    TRIG 39 05/21/2015

## 2019-04-22 NOTE — PROGRESS NOTES
Control 5 Carbs/Meal   · Oral Diet intake: 51-75%  · Oral Nutrition Supplement (ONS) Orders: Diabetic Oral Supplement  · ONS intake:  unable to assess  · Anthropometric Measures:  · Ht: 5' 9\" (175.3 cm)   · Current Body Wt: 144 lb (65.3 kg)(4/22 standing scale)  · Admission Body Wt: 147 lb (66.7 kg)(4/21 first actual)  · Usual Body Wt: 161 lb (73 kg)(3/20/19 bed scale)  · % Weight Change:  ,  11 % weight loss x 1 month  · Ideal Body Wt: 160 lb (72.6 kg), % Ideal Body 90%  · BMI Classification: BMI 18.5 - 24.9 Normal Weight    Nutrition Interventions:   Continue current diet, Continue current ONS  Continued Inpatient Monitoring, Education Completed, Coordination of Care    Nutrition Evaluation:   · Evaluation: Goals set   · Goals: Consume > 75% of meals/ONS.     · Monitoring: Meal Intake, Supplement Intake, Diet Tolerance, I&O, Weight, Pertinent Labs, Patient/Family Education      Electronically signed by Juan J Carolina RD, LD on 4/22/19 at 12:49 PM    Contact Number:  Ext [phone]

## 2019-04-22 NOTE — PROGRESS NOTES
Dr Hermilo Cardona notified of repeat lactic 1.3. Okay to continue fluids at this time. Sherlyn Koch RN

## 2019-04-22 NOTE — CARE COORDINATION
Fax sheet faxed to [facility] per charge rn. Called and spoke with Shilpi Palacios at [facility]#[phone] option #4 regarding transfer to [facility]. [facility] will need to obtain auth for hospital stay. Called and spoke with Magaly Hdz at [facility] regarding ambulance transfer to [facility]. DS#[phone]. All requested information was given to her. I told her we did not have a bed assignment yet. She said [facility] will need to set up transportation. I am to call Demarcus Painter at [facility]#[phone] if it is before 6pm. After 6pm, then nursing is to call the on call phone number for [facility] is #[phone]. Await bed assignment. [id number]  Received a call from Demarcus Painter from [facility]. She said we will have to go through [facility] now to set up ambulance transportation. I called #[phone] and spoke with Nikky Reza and then was transferred to [facility]. All requested info was given to [facility]. I clearly stated to her that we do not have a bed assignment yet. She said we are to call [facility]#[phone] once bed assigned and they will arrange ambulance. Updated nursing.

## 2019-04-22 NOTE — PROGRESS NOTES
Nutrition Education    Type and Reason for Visit:  Education     · Verbally reviewed following information with patient/wife. · Written educational materials provided for CHO controlled, 2000 calories. · Contact name and number provided. · Refer to Patient Education activity for more details.     Electronically signed by Juan J Carolina RD, LD on 4/22/19 at 12:54 PM    Contact Number:  Ext [phone]

## 2019-04-22 NOTE — CARE COORDINATION
Transition of care: Met with patient in room. Pt lives with his wife, Baljit Prince, in a 2 story home. Needs assistance x 1 with ADLs. Pt's wife drives for him. DME- wheeled walker and a cane, which pt states he uses. Not a . No history of hhc. PCP Is Dr. Chaz Davison and pharmacy is [facility] on [address]. Dr. Clint Steven. Alessandro Rank in room to see patient.   Sw/cm will follow

## 2019-04-22 NOTE — PLAN OF CARE
Problem: Pain:  Goal: Pain level will decrease  Description  Pain level will decrease  Outcome: Met This Shift     Problem: Falls - Risk of:  Goal: Will remain free from falls  Description  Will remain free from falls  Outcome: Met This Shift     Problem: Cardiac:  Goal: Ability to maintain an adequate cardiac output will improve  Description  Ability to maintain an adequate cardiac output will improve  Outcome: Met This Shift

## 2019-04-22 NOTE — CARE COORDINATION
Informed per nursing of  transfer to [facility] or [facility]. Ambulance form filled out and placed with pt's soft chart. Phone number for [facility]  [facility] ambulance left for nursing just in case bed assignment comes after sw/cm are gone for the day.

## 2019-04-22 NOTE — PLAN OF CARE
Problem: Pain:  Goal: Pain level will decrease  Description  Pain level will decrease  Outcome: Met This Shift     Problem: Falls - Risk of:  Goal: Will remain free from falls  Description  Will remain free from falls  Outcome: Met This Shift     Problem: Falls - Risk of:  Goal: Absence of physical injury  Description  Absence of physical injury  Outcome: Met This Shift     Problem: Cardiac:  Goal: Ability to maintain an adequate cardiac output will improve  Description  Ability to maintain an adequate cardiac output will improve  Outcome: Met This Shift

## 2019-04-22 NOTE — DISCHARGE INSTR - DIET
 Good nutrition is important when healing from an illness, injury, or surgery. Follow any nutrition recommendations given to you during your hospital stay.  If you were given an oral nutrition supplement while in the hospital, continue to take this supplement at home. You can take it with meals, in-between meals, and/or before bedtime. Glucerna shakes two times per day. These supplements can be purchased at most local grocery stores, pharmacies, and chain super-stores.  If you have any questions about your diet or nutrition, call the hospital and ask for the dietitian.

## 2019-04-22 NOTE — CONSULTS
CTS Consult    Patient name: Karie Rivas    Reason for consult: AI    Referring Physician: Dr. Rupali Hall    Primary Care Physician: Sarah Beth Hopkins DO    Date of service: 4/22/2019    Chief Complaint: Palpitations    HPI: 79year old known to me in the sense that he has known severe AI. Heart cath was normal.  He is admitted with SVT and palpitations. He reports feeling GONZALES and SOB as well. Blood cultures are now positive. He has been worked up for his anemia but no source has been found. He is a Jehovah Witness and categorically refuses blood or blood products.     Allergies: No Known Allergies    Home medications:    Current Facility-Administered Medications   Medication Dose Route Frequency Provider Last Rate Last Dose    metoprolol tartrate (LOPRESSOR) tablet 50 mg  50 mg Oral BID Akua Coleman MD   25 mg at 04/22/19 0946    sodium chloride flush 0.9 % injection 10 mL  10 mL Intravenous 2 times per day Chinedu Clementeen Crigler, MD   10 mL at 04/22/19 0810    sodium chloride flush 0.9 % injection 10 mL  10 mL Intravenous PRN Chinedu Clementeen Crigler, MD   10 mL at 04/21/19 0755    magnesium hydroxide (MILK OF MAGNESIA) 400 MG/5ML suspension 30 mL  30 mL Oral Daily PRN Chinedu Clementeen Crigler, MD        ondansetron [facility]) injection 4 mg  4 mg Intravenous Q6H PRN Chinedu Clementeen Crigler, MD        enoxaparin (LOVENOX) injection 40 mg  40 mg Subcutaneous Daily Chinedu Clementeen Crigler, MD   40 mg at 04/22/19 0810    atorvastatin (LIPITOR) tablet 20 mg  20 mg Oral Daily MALLORY Ovalles CNP   20 mg at 04/22/19 0810    lisinopril (PRINIVIL;ZESTRIL) tablet 40 mg  40 mg Oral Daily MALLORY Ovalles CNP   40 mg at 04/22/19 0810    psyllium (KONSYL) 28.3 % packet 1 packet  1 packet Oral Daily MALLORY Ovalles CNP   1 packet at 04/22/19 0810    0.9 % sodium chloride infusion   Intravenous Continuous MALLORY Ovalles CNP 75 mL/hr at 04/21/19 1416         Past Medical History:  Past Medical History: Diagnosis Date    Aortic insufficiency     Aortic regurgitation     Chronic anemia 4/21/2019    Hyperlipidemia     Hypertension     Mitral regurgitation     Prostate CA (HCC)     Refusal of blood transfusions as patient is Christianity 4/21/2019    Type 2 diabetes mellitus, without long-term current use of insulin ([facility] [address] 4/22/2019       Past Surgical History:  Past Surgical History:   Procedure Laterality Date    ABLATION OF DYSRHYTHMIC FOCUS  05/29/2013    SVT ABLATION     CARDIAC CATHETERIZATION  4/2013    CARDIAC CATHETERIZATION  03/28/2019    Dr Prabhu Turner      TRANSESOPHAGEAL ECHOCARDIOGRAM  03/28/2019    DR. Cordova       Social History:  Social History     Socioeconomic History    Marital status:      Spouse name: Not on file    Number of children: Not on file    Years of education: Not on file    Highest education level: Not on file   Occupational History    Not on file   Social Needs    Financial resource strain: Not on file    Food insecurity:     Worry: Not on file     Inability: Not on file    Transportation needs:     Medical: Not on file     Non-medical: Not on file   Tobacco Use    Smoking status: Never Smoker    Smokeless tobacco: Never Used   Substance and Sexual Activity    Alcohol use: No    Drug use: No    Sexual activity: Not Currently   Lifestyle    Physical activity:     Days per week: Not on file     Minutes per session: Not on file    Stress: Not on file   Relationships    Social connections:     Talks on phone: Not on file     Gets together: Not on file     Attends Yazidism service: Not on file     Active member of club or organization: Not on file     Attends meetings of clubs or organizations: Not on file     Relationship status: Not on file    Intimate partner violence:     Fear of current or ex partner: Not on file     Emotionally abused: Not on file     Physically abused: Not on file     Forced sexual activity: Not on file   Other Topics Concern    Not on file   Social History Narrative    Not on file       Family History:  Family History   Problem Relation Age of Onset    Stroke Mother     Heart Attack Father        Review of Systems:  Constitutional: Denies fevers, chills, or weight loss. HEENT: Denies visual changes or hearing loss. Heart: As per HPI. Lungs: Denies shortness of breath, cough, or wheezing. Gastrointestinal: Denies nausea, vomiting, constipation, or diarrhea. Genitourinary: dysuria or hematuria. Psychiatric: Patient denies anxiety or depression. Neurologic: Patient denies weakness of the extremities, dizziness, or headaches. All other ROS checked and found to be negative. Objective:  Vitals BP (!) 148/62   Pulse 86   Temp 99 °F (37.2 °C) (Temporal)   Resp 16   Ht 5' 9\" (1.753 m)   Wt 144 lb 11.2 oz (65.6 kg)   SpO2 98%   BMI 21.37 kg/m²   General Appearance: Pleasant 79y.o. year old male who appears stated age. Communicates well, no acute distress. HEENT: Head is normocephalic, atraumatic. EOMs intact, PERRL. Trachea midline. Lungs: Normal respiratory rate and normal effort. He is not in respiratory distress. Breath sounds clear to auscultation. No wheezes. Heart: Normal rate. Regular rhythm. S1 normal and S2 normal. Positive for murmur. Chest: Symmetric chest wall expansion. Extremities: Normal range of motion. Neurological: Patient is alert and oriented to person, place and time. Patient has normal reflexes. Skin: Warm and dry. Abdomen: Abdomen is soft and non-distended. Bowel sounds are normal. There is no abdominal tenderness tenderness. There is no guarding. There is no mass. Pulses: Distal pulses are intact. Skin: Warm and dry without lesions. Assessment: AI, anemia        Plan: His blood cultures are positive and his Hb is 8.2. He will not accept blood. I do not feel he is an operative candidate.   I recommended they get a second opinion elsewhere.       Electronically signed by J Luis Jackson MD on 4/22/2019 at 11:09 AM

## 2019-04-22 NOTE — CONSULTS
Intravenous 2 times per day    enoxaparin  40 mg Subcutaneous Daily    atorvastatin  20 mg Oral Daily    lisinopril  40 mg Oral Daily    psyllium  1 packet Oral Daily     Continuous Infusions:   sodium chloride 75 mL/hr at 04/21/19 1416     PRN Meds:sodium chloride flush, magnesium hydroxide, ondansetron    Allergies:  Patient has no known allergies. Social History:   Social History     Socioeconomic History    Marital status:      Spouse name: None    Number of children: None    Years of education: None    Highest education level: None   Occupational History    None   Social Needs    Financial resource strain: None    Food insecurity:     Worry: None     Inability: None    Transportation needs:     Medical: None     Non-medical: None   Tobacco Use    Smoking status: Never Smoker    Smokeless tobacco: Never Used   Substance and Sexual Activity    Alcohol use: No    Drug use: No    Sexual activity: Not Currently   Lifestyle    Physical activity:     Days per week: None     Minutes per session: None    Stress: None   Relationships    Social connections:     Talks on phone: None     Gets together: None     Attends Adventist service: None     Active member of club or organization: None     Attends meetings of clubs or organizations: None     Relationship status: None    Intimate partner violence:     Fear of current or ex partner: None     Emotionally abused: None     Physically abused: None     Forced sexual activity: None   Other Topics Concern    None   Social History Narrative    None       Family History:       Problem Relation Age of Onset    Stroke Mother     Heart Attack Father    . Otherwise non-pertinent to the chief complaint.     REVIEW OF SYSTEMS:    14 ROS negative unless otherwise specified in the HPI    PHYSICAL EXAM:    Vitals:    BP (!) 148/68   Pulse 95   Temp 98.5 °F (36.9 °C) (Temporal)   Resp 20   Ht 5' 9\" (1.753 m)   Wt 144 lb 11.2 oz (65.6 kg)   SpO2 96%

## 2019-04-22 NOTE — CONSULTS
Department of General Surgery - Adult  Surgical Service   Attending Consult Note      Reason for Consult:  GI bleed  Requesting Physician:  Shelia Menezes DO    CHIEF COMPLAINT:  SVT    History Obtained From:  patient, electronic medical record    HISTORY OF PRESENT ILLNESS:                The patient is a 79 y.o. male who presents with SVT and fatigue while being worked up for source of blood loss. Past Medical History:        Diagnosis Date    Aortic insufficiency     Aortic regurgitation     Chronic anemia 4/21/2019    Hyperlipidemia     Hypertension     Mitral regurgitation     [facility] CA [facility]     Refusal of blood transfusions as patient is [place] 4/21/2019    Type 2 diabetes mellitus, without long-term current use of insulin ([facility] 4/22/2019     Past Surgical History:        Procedure Laterality Date    ABLATION OF DYSRHYTHMIC FOCUS  05/29/2013    SVT ABLATION     CARDIAC CATHETERIZATION  4/2013    CARDIAC CATHETERIZATION  03/28/2019    Dr Keyana Jones      TRANSESOPHAGEAL ECHOCARDIOGRAM  03/28/2019    DR. Cordova     Current Medications:   Current Facility-Administered Medications: metoprolol tartrate (LOPRESSOR) tablet 50 mg, 50 mg, Oral, BID  cefTRIAXone (ROCEPHIN) 2 g in sterile water 20 mL IV syringe, 2 g, Intravenous, Q24H  sodium chloride flush 0.9 % injection 10 mL, 10 mL, Intravenous, 2 times per day  sodium chloride flush 0.9 % injection 10 mL, 10 mL, Intravenous, PRN  magnesium hydroxide (MILK OF MAGNESIA) 400 MG/5ML suspension 30 mL, 30 mL, Oral, Daily PRN  ondansetron (ZOFRAN) injection 4 mg, 4 mg, Intravenous, Q6H PRN  enoxaparin (LOVENOX) injection 40 mg, 40 mg, Subcutaneous, Daily  atorvastatin (LIPITOR) tablet 20 mg, 20 mg, Oral, Daily  lisinopril (PRINIVIL;ZESTRIL) tablet 40 mg, 40 mg, Oral, Daily  psyllium (KONSYL) 28.3 % packet 1 packet, 1 packet, Oral, Daily  0.9 % sodium chloride infusion, , Intravenous, Continuous  Allergies: Patient has no known allergies. Social History:   N/C except for refusing any blood transfusion  Family History:       Problem Relation Age of Onset    Stroke Mother     Heart Attack Father        REVIEW OF SYSTEMS:    H&P    PHYSICAL EXAM:    VITALS:  BP (!) 148/62   Pulse 86   Temp 99 °F (37.2 °C) (Temporal)   Resp 16   Ht 5' 9\" (1.753 m)   Wt 144 lb 11.2 oz (65.6 kg)   SpO2 98%   BMI 21.37 kg/m²   24HR INTAKE/OUTPUT:    Intake/Output Summary (Last 24 hours) at 2019 1140  Last data filed at 2019 0620  Gross per 24 hour   Intake 1050 ml   Output 525 ml   Net 525 ml     TEMPERATURE:  Current - Temp: 99 °F (37.2 °C);  Max - Temp  Av.4 °F (36.9 °C)  Min: 98 °F (36.7 °C)  Max: 99 °F (37.2 °C)  RESPIRATIONS RANGE: Resp  Av.5  Min: 16  Max: 20  PULSE RANGE: Pulse  Av.8  Min: 86  Max: 99  BLOOD PRESSURE RANGE:  Systolic (39NCA), LHQ:566 , Min:134 , AJW:590   ; Diastolic (25ZSX), DXX:59, Min:60, Max:68    PULSE OXIMETRY RANGE: SpO2  Av.3 %  Min: 95 %  Max: 99 %  CONSTITUTIONAL:  fatigued, alert, cooperative, no apparent distress, appears stated age and normal weight  EYES:  lids and lashes normal, pupils equal, round and reactive to light, extra-ocular muscles intact, sclera clear and conjunctiva normal  NECK:  supple, symmetrical, trachea midline, skin normal, no stridor and no jugular venous distension  LUNGS:  no increased work of breathing, good air exchange, no retractions and clear to auscultation  CARDIOVASCULAR:  normal apical pulses, regular rate and rhythm and normal S1 and S2  ABDOMEN:  normal bowel sounds, soft, non-distended, non-tender, voluntary guarding absent and no masses palpated  DATA:    CBC with Differential:    Lab Results   Component Value Date    WBC 12.2 2019    RBC 3.10 2019    HGB 8.2 2019    HCT 26.0 2019     2019    MCV 83.9 2019    MCH 26.5 2019    MCHC 31.5 2019    RDW 16.0 2019    METACT

## 2019-04-22 NOTE — PROGRESS NOTES
Latonya called from [address] [place] [address] and stated that the bed was on hold until an authorization was obtained from the insurance company. I call Reji Morrison RN/ and updated her.

## 2019-04-22 NOTE — PROGRESS NOTES
Messaged Dr Karla Betancourt with positive blood culture results. Attempted to send to Dr Lynda Eagle, however, perfect serve routed the message to Dr Karla Betancourt.

## 2019-04-23 NOTE — PROGRESS NOTES
Department of General Surgery - Adult  Surgical Service   Attending Progress Note      SUBJECTIVE:  Stable comfortable    OBJECTIVE  Positive blood culture G+ cocci    Physical    VITALS:  BP (!) 142/63   Pulse 89   Temp 97.6 °F (36.4 °C) (Oral)   Resp 16   Ht 5' 9\" (1.753 m)   Wt 148 lb 14.4 oz (67.5 kg)   SpO2 97%   BMI 21.99 kg/m²   INTAKE/OUTPUT:    Intake/Output Summary (Last 24 hours) at 2019 1102  Last data filed at 2019 0943  Gross per 24 hour   Intake 1536 ml   Output 1900 ml   Net -364 ml     TEMPERATURE:  Current - Temp: 97.6 °F (36.4 °C);  Max - Temp  Av.5 °F (36.9 °C)  Min: 97.6 °F (36.4 °C)  Max: 99.3 °F (37.4 °C)  RESPIRATIONS RANGE: Resp  Av  Min: 16  Max: 20  PULSE RANGE: Pulse  Av.7  Min: 81  Max: 97  BLOOD PRESSURE RANGE:  Systolic (32UEJ), TCW:108 , Min:133 , VQ   ; Diastolic (35CQX), IDD:93, Min:60, Max:70    PULSE OXIMETRY RANGE: SpO2  Av %  Min: 96 %  Max: 98 %  CONSTITUTIONAL:  awake, alert, cooperative, no apparent distress, appears stated age and normal weight  EYES:  lids and lashes normal, pupils equal, round and reactive to light, extra-ocular muscles intact and sclera clear  LUNGS:  no increased work of breathing, good air exchange, no retractions and diminished breath sounds right base and left base  CARDIOVASCULAR:  normal apical pulses, regular rate and rhythm and normal S1 and S2  ABDOMEN:  normal bowel sounds, soft, non-distended, non-tender, voluntary guarding absent and no masses palpated  Data  CBC with Differential:    Lab Results   Component Value Date    WBC 12.2 2019    RBC 3.10 2019    HGB 7.9 2019    HCT 25.6 2019     2019    MCV 83.9 2019    MCH 26.5 2019    MCHC 31.5 2019    RDW 16.0 2019    METASPCT 0.9 2019    LYMPHOPCT 7.8 2019    MONOPCT 8.5 2019    BASOPCT 0.1 2019    MONOSABS 1.04 2019    LYMPHSABS 0.95 2019    EOSABS 0.01 04/22/2019    BASOSABS 0.01 04/22/2019     CMP:    Lab Results   Component Value Date     04/22/2019    K 4.1 04/22/2019     04/22/2019    CO2 23 04/22/2019    BUN 13 04/22/2019    CREATININE 0.7 04/22/2019    GFRAA >60 04/22/2019    LABGLOM >60 04/22/2019    GLUCOSE 136 04/22/2019    GLUCOSE 98 03/29/2012    PROT 6.8 04/21/2019    LABALBU 2.8 04/21/2019    LABALBU 4.5 03/29/2012    CALCIUM 8.4 04/22/2019    BILITOT 0.8 04/21/2019    ALKPHOS 132 04/21/2019    AST 45 04/21/2019    ALT 78 04/21/2019       Current Inpatient Medications    Current Facility-Administered Medications: metoprolol tartrate (LOPRESSOR) tablet 50 mg, 50 mg, Oral, BID  cefTRIAXone (ROCEPHIN) 2 g in sterile water 20 mL IV syringe, 2 g, Intravenous, Q24H  sodium chloride flush 0.9 % injection 10 mL, 10 mL, Intravenous, PRN  vancomycin (VANCOCIN) 1,250 mg in dextrose 5 % 250 mL IVPB, 1,250 mg, Intravenous, Q12H  sodium chloride flush 0.9 % injection 10 mL, 10 mL, Intravenous, 2 times per day  magnesium hydroxide (MILK OF MAGNESIA) 400 MG/5ML suspension 30 mL, 30 mL, Oral, Daily PRN  ondansetron (ZOFRAN) injection 4 mg, 4 mg, Intravenous, Q6H PRN  atorvastatin (LIPITOR) tablet 20 mg, 20 mg, Oral, Daily  lisinopril (PRINIVIL;ZESTRIL) tablet 40 mg, 40 mg, Oral, Daily  psyllium (KONSYL) 28.3 % packet 1 packet, 1 packet, Oral, Daily  0.9 % sodium chloride infusion, , Intravenous, Continuous    ASSESSMENT AND PLAN positive blood culture noted  Transfer to [facility] in progress  Negative bleeding scan  Follow up as out patient

## 2019-04-23 NOTE — PROGRESS NOTES
Hospitalist Progress Note      PCP: Jesús Cosme DO    Date of Admission: 4/21/2019    Chief Complaint: SVT    Hospital Course: ** needs a TAVR however, he  Has a chronic anemia, went for a bleeding scan  It was neg may have to go to [facility]  for further evaluation    **     Subjective: **no complaints      Medications:  Reviewed    Infusion Medications    sodium chloride 75 mL/hr at 04/23/19 0018     Scheduled Medications    metoprolol tartrate  75 mg Oral BID    cefTRIAXone (ROCEPHIN) IV  2 g Intravenous Q24H    vancomycin  1,250 mg Intravenous Q12H    sodium chloride flush  10 mL Intravenous 2 times per day    atorvastatin  20 mg Oral Daily    lisinopril  40 mg Oral Daily    psyllium  1 packet Oral Daily     PRN Meds: sodium chloride flush, magnesium hydroxide, ondansetron      Intake/Output Summary (Last 24 hours) at 4/23/2019 1526  Last data filed at 4/23/2019 1434  Gross per 24 hour   Intake 1872 ml   Output 2350 ml   Net -478 ml       Exam:    BP (!) 140/65   Pulse 95   Temp 98.9 °F (37.2 °C) (Temporal)   Resp 16   Ht 5' 9\" (1.753 m)   Wt 148 lb 14.4 oz (67.5 kg)   SpO2 98%   BMI 21.99 kg/m²       Gen: *well developed  HEENT: NC/AT, moist mucous membranes,   Neck: supple, trachea midline, no anterior cervical or SC LAD  Heart:  Normal s1/s2, RRR, no murmurs, gallops, or rubs. Lungs:  cta * bilaterally,   Abd: bowel sounds present, soft, nontender, nondistended, no masses  Extrem:  No clubbing, cyanosis,   Pos  edema  Skin: no rashes or lesions  Psych: A & O x3  Neuro: grossly intact, moves all four extremities.     Capillary Refill: Brisk,< 3 seconds   Peripheral Pulses: +2 palpable, equal bilaterally                     Labs:   Recent Labs     04/21/19 0210 04/22/19  0610 04/23/19  0603 04/23/19  0845   WBC 14.1* 12.2*  --   --    HGB 9.0* 8.2*  --  7.9*   HCT 28.5* 26.0* 25.0* 25.6*    342  --   --      Recent Labs     04/21/19 0210 04/22/19  0610   * 135   K 4.0 4.1   CL 95* 100   CO2 22 23   BUN 15 13   CREATININE 0.8 0.7   CALCIUM 8.7 8.4*     Recent Labs     04/21/19  0210   AST 45*   ALT 78*   BILITOT 0.8   ALKPHOS 132*     No results for input(s): INR in the last 72 hours. Recent Labs     04/21/19  0210 04/21/19  0956 04/21/19  1355   TROPONINI 0.06* 0.13* 0.11*     Recent Labs     04/21/19  0210   AST 45*   ALT 78*   BILITOT 0.8   ALKPHOS 132*     No results for input(s): LACTA in the last 72 hours.   No results found for: Nolvia Lout  No results found for: AMMONIA    Assessment:    Active Hospital Problems    Diagnosis Date Noted    Type 2 diabetes mellitus, without long-term current use of insulin ([facility] [E11.9] 04/22/2019    Moderate protein-energy malnutrition (HCC) [E44.0] 04/22/2019    Tachycardia [R00.0] 04/21/2019    Chronic anemia [D64.9] 04/21/2019    Patient is Jew [Z78.9] 04/21/2019    SVT (supraventricular tachycardia) (HCC) [I47.1] 04/21/2019    Hypertension [I10]     Prostate CA ([facility] Hilda Golds     Aortic valve regurgitation [I35.1]    bacteremia      Plan:  Bleeding scan   possible transfer to [facility]  Cont lipitor   cont zestril   cont lopressor  Cont rocephin/vanco     DVT Prophylaxis:  lovenox  Diet: Dietary Nutrition Supplements: Diabetic Oral Supplement  DIET CARDIAC; Carb Control: 5 carb choices (75 gms)/meal  Code Status: Full Code     PT/OT Eval Status:  ordered     Dispo - [facility]             Electronically signed by Adelso Bauman DO on 4/23/2019 at 3:26 PM

## 2019-04-23 NOTE — PLAN OF CARE
I received word from case management that due to insurance issues, the patient will likely not be able to be treated at [facility]. I contacted [facility], which does accept the patient's insurance. Spoke with a Dr. Amanda Paul there, who accepted the patient. Plans are now in motion for him to be transferred to [facility] [place].  Dr. Jessica Bright

## 2019-04-23 NOTE — PLAN OF CARE
I personally spoke with [facility] cardiology yesterday 4/22 and discussed the case with them. They accepted the patient in transfer.  Dr. Lowell Yap

## 2019-04-24 NOTE — CARE COORDINATION
[address] and spoke with Steve at [facility]. They are still working on bed assignment for patient. Pt and pt's wife notified. Seun and spoke with Steve at  [facility]. Still working on bed assignment. Called and spoke with Dana Jenkins at [facility] and сергей Damon at [facility] and verified they are still able to transfer pt to [facility] via ambulance when bed becomes available. Dora Banks 36 at [facility] updated regarding patient and no bed available at [facility].

## 2019-04-24 NOTE — PROGRESS NOTES
CC- SOB    Subjective: The patient is awake and alert. Tolerating medications. Reports no side effects. Afebrile. 10 ROS otherwise negative unless otherwise specified above. Objective:    Vitals:    04/24/19 1223   BP: 124/62   Pulse: 87   Resp: 16   Temp: 98.5 °F (36.9 °C)   SpO2:        General Appearance:    Awake, alert , no acute distress. HEENT:    Normocephalic,PERRL,neck supple, no JVD, mucosa moist, no thrush   Lungs:     Clear to auscultation bilaterally, no wheeze , crackles     Heart:    Regular rate and rhythm, S1 and S2 normal, gr 3 systolic L sternal border and gr 2 diastolic murmur R sternal border   Abdomen:     Soft, non-tender, bowel sounds active all four quadrants,     no masses, no organomegaly   Extremities:   Extremities normal, atraumatic, no cyanosis or edema   Pulses:   2+ and symmetric all extremities   Skin:   Skin color, texture, turgor normal, no rashes or lesions         CBC+dif:  Reviewed and trend followed     Radiology:  Noted     Microbiology:  bc- 4/21/19- GPC chains  4/22-BC pending     Assessment:  · Streptococcus gordonii bacteremia ?  GI origin r/o Infective endocaritis   · Sever aortic insufficiency  · Recent h/o normal colonoscopy  · H/o prostate cancer- completed XBRT in 2011- PSA 0.58 in september 2018  · chronic anemia     Plan:    · SWITCH to iv penicillin 2 g q4  · DC vancomycin and rocephin F/u [facility]  ·  transfer for AI surgery awaiting bed  · Monitor labs  · Will follow with you        Electronically signed by Jeremiah Damon MD on 4/24/2019 at 2:59 PM

## 2019-04-24 NOTE — PROGRESS NOTES
hours. No results for input(s): INR in the last 72 hours. No results for input(s): Benzie Reasoner in the last 72 hours. No results for input(s): AST, ALT, ALB, BILIDIR, BILITOT, ALKPHOS in the last 72 hours. No results for input(s): LACTA in the last 72 hours.   No results found for: Chata Macias  No results found for: AMMONIA    Assessment:    Active Hospital Problems    Diagnosis Date Noted    Type 2 diabetes mellitus, without long-term current use of insulin ([facility] [E11.9] 04/22/2019    Moderate protein-energy malnutrition (HCC) [E44.0] 04/22/2019    Tachycardia [R00.0] 04/21/2019    Chronic anemia [D64.9] 04/21/2019    Patient is Taoism [Z78.9] 04/21/2019    SVT (supraventricular tachycardia) (HCC) [I47.1] 04/21/2019    Hypertension [I10]     [facility]     Aortic valve regurgitation [I35.1]    bacteremia      Plan:  Bleeding scan   possible transfer to [facility]  Cont lipitor   cont zestril   cont lopressor  Cont rocephin/vanco     DVT Prophylaxis:  lovenox  Diet: Dietary Nutrition Supplements: Diabetic Oral Supplement  DIET CARDIAC; Carb Control: 5 carb choices (75 gms)/meal  Code Status: Full Code     PT/OT Eval Status:  ordered     Dispo - [facility]                Electronically signed by Rossy Singer DO on 4/24/2019 at 2:23 PM

## 2019-04-24 NOTE — PROGRESS NOTES
Steve from [employer] ([phone], Option #2) stated he called and imitated the process and set up transfer for the patient. The ambulance will be able to  the patient around 8 pm. Patient is going to [facility] 5[facility] Bed 83. Bedside RN to call Nurse to Nurse at [phone].

## 2019-04-25 NOTE — DISCHARGE SUMMARY
CONSULT TO INFECTIOUS DISEASES    Labs: For convenience and continuity at follow-up the following most recent labs are provided:    Lab Results   Component Value Date    WBC 12.2 04/22/2019    HGB 7.9 04/23/2019    HCT 25.6 04/23/2019    MCV 83.9 04/22/2019     04/22/2019     04/22/2019    K 4.1 04/22/2019     04/22/2019    CO2 23 04/22/2019    BUN 13 04/22/2019    CREATININE 0.7 04/22/2019    CALCIUM 8.4 04/22/2019    ALKPHOS 132 04/21/2019    ALT 78 04/21/2019    AST 45 04/21/2019    BILITOT 0.8 04/21/2019    LABALBU 2.8 04/21/2019    LABALBU 4.5 03/29/2012    LDLCALC 62 06/15/2018    TRIG 72 06/15/2018     Lab Results   Component Value Date    INR 1.4 03/21/2019    INR 1.2 05/29/2013       Radiology:  NM GI BLOOD LOSS   Final Result   No evidence of an active gastrointestinal hemorrhage. XR CHEST PORTABLE   Final Result   Cardiomegaly   Findings compatible with atherosclerotic disease of the aorta.    No acute infiltrate                               Discharge Medications:   Discharge Medication List as of 4/24/2019  7:33 PM      START taking these medications    Details   metoprolol tartrate (LOPRESSOR) 50 MG tablet Take 1 tablet by mouth 2 times daily, Disp-60 tablet, R-3DC to [facility]           Discharge Medication List as of 4/24/2019  7:33 PM      CONTINUE these medications which have CHANGED    Details   atorvastatin (LIPITOR) 20 MG tablet Take 1 tablet by mouth daily, Disp-30 tablet, R-3DC to SNF      lisinopril (PRINIVIL;ZESTRIL) 40 MG tablet Take 1 tablet by mouth daily, Disp-30 tablet, R-3DC to SNF           Discharge Medication List as of 4/24/2019  7:33 PM        Discharge Medication List as of 4/24/2019  7:33 PM      STOP taking these medications       amLODIPine (NORVASC) 5 MG tablet Comments:   Reason for Stopping:         Psyllium (METAMUCIL FREE & NATURAL) 43 % POWD Comments:   Reason for Stopping:         Multiple Vitamins-Minerals (ONE-A-DAY 50 PLUS PO) Comments: Reason for Stopping:         Omega-3 Fatty Acids (FISH OIL) 1000 MG CAPS Comments:   Reason for Stopping: Follow-up appointments:  1 week    Provider Follow-up:    pmd    Condition at Discharge:  Stable    The patient was seen and examined on day of discharge and this discharge summary is in conjunction with any daily progress note from day of discharge. Time Spent on discharge is 45 minutes  in the examination, evaluation, counseling and review of medications and discharge plan. Signed:    Fara Mcdowell DO   4/25/2019      Thank you Janneth Masters DO for the opportunity to be involved in this patient's care.  If you have any questions or concerns please feel free to contact me at [phone]

## 2019-06-07 NOTE — TELEPHONE ENCOUNTER
Patient and Ileen Alpers walked in to our office to reschedule cancelled follow up appointment with Dr. Aria Bosch. Patient's main concern is getting EPOgen injection to help boost blood count. Patient does not want to travel to [place] to [facility] and wants to come to our office instead. Patient is currently seeing Dr. Navin Gonzalez from [address] [place] [address] regarding injection. Notified Zoila RODGERS and she stated our office will have to get prior authorization for injection. Scheduled patient for 06/19/19. Made copy of information from Dr. Nona Rock office. Gave copy to Zoila RODGERS for Dr. Aria Bosch. Patient is going to [facility] lab to have blood work done on 06/17/19. Blood work to determine if injection needed. Zoila RODGERS to notify [facility] regarding prior authorization and Dr. Nona Rock office is faxing information to Erick Ray.